# Patient Record
Sex: FEMALE | Race: OTHER | NOT HISPANIC OR LATINO | URBAN - METROPOLITAN AREA
[De-identification: names, ages, dates, MRNs, and addresses within clinical notes are randomized per-mention and may not be internally consistent; named-entity substitution may affect disease eponyms.]

---

## 2023-03-20 LAB
ABO GROUP (TYPE) IN BLOOD: NORMAL
ANTIBODY SCREEN: NORMAL
ERYTHROCYTE DISTRIBUTION WIDTH (RATIO) BY AUTOMATED COUNT: 13.4 % (ref 11.5–14.5)
ERYTHROCYTE MEAN CORPUSCULAR HEMOGLOBIN CONCENTRATION (G/DL) BY AUTOMATED: 31.3 G/DL (ref 32–36)
ERYTHROCYTE MEAN CORPUSCULAR VOLUME (FL) BY AUTOMATED COUNT: 87 FL (ref 80–100)
ERYTHROCYTES (10*6/UL) IN BLOOD BY AUTOMATED COUNT: 4.85 X10E12/L (ref 4–5.2)
HEMATOCRIT (%) IN BLOOD BY AUTOMATED COUNT: 42.2 % (ref 36–46)
HEMOGLOBIN (G/DL) IN BLOOD: 13.2 G/DL (ref 12–16)
HEPATITIS B VIRUS SURFACE AG PRESENCE IN SERUM: NONREACTIVE
HEPATITIS C VIRUS AB PRESENCE IN SERUM: NONREACTIVE
HIV 1/ 2 AG/AB SCREEN: NONREACTIVE
LEUKOCYTES (10*3/UL) IN BLOOD BY AUTOMATED COUNT: 8 X10E9/L (ref 4.4–11.3)
NRBC (PER 100 WBCS) BY AUTOMATED COUNT: 0 /100 WBC (ref 0–0)
PLATELETS (10*3/UL) IN BLOOD AUTOMATED COUNT: 234 X10E9/L (ref 150–450)
REFLEX ADDED, ANEMIA PANEL: ABNORMAL
RH FACTOR: NORMAL
RUBELLA VIRUS IGG AB: POSITIVE
SYPHILIS TOTAL AB: NONREACTIVE
THYROTROPIN (MIU/L) IN SER/PLAS BY DETECTION LIMIT <= 0.05 MIU/L: 5.18 MIU/L (ref 0.44–3.98)
THYROXINE (T4) FREE (NG/DL) IN SER/PLAS: 0.99 NG/DL (ref 0.78–1.48)
VARICELLA ZOSTER IGG: POSITIVE

## 2023-03-21 LAB
CHLAMYDIA TRACH., AMPLIFIED: NEGATIVE
N. GONORRHEA, AMPLIFIED: NEGATIVE
URINE CULTURE: NORMAL

## 2023-03-23 LAB
HEMOGLOBIN A2: 2.7 %
HEMOGLOBIN A: 97.1 %
HEMOGLOBIN F: 0.2 %
HEMOGLOBIN IDENTIFICATION INTERPRETATION: NORMAL
PATH REVIEW-HGB IDENTIFICATION: NORMAL

## 2023-05-06 ENCOUNTER — APPOINTMENT (OUTPATIENT)
Dept: LAB | Facility: LAB | Age: 33
End: 2023-05-06
Payer: COMMERCIAL

## 2023-05-11 LAB — LAB MOLECULAR CA TECHNICAL NOTES: NORMAL

## 2023-05-18 LAB
CLUE CELLS: NORMAL
NUGENT SCORE: 1
YEAST: NORMAL

## 2023-06-15 DIAGNOSIS — E55.9 VITAMIN D DEFICIENCY: Primary | ICD-10-CM

## 2023-06-15 RX ORDER — ERGOCALCIFEROL 1.25 MG/1
CAPSULE ORAL
Qty: 4 CAPSULE | Refills: 3 | Status: SHIPPED | OUTPATIENT
Start: 2023-06-15 | End: 2023-10-19

## 2023-06-21 ENCOUNTER — APPOINTMENT (OUTPATIENT)
Dept: LAB | Facility: LAB | Age: 33
End: 2023-06-21
Payer: COMMERCIAL

## 2023-06-21 LAB
THYROTROPIN (MIU/L) IN SER/PLAS BY DETECTION LIMIT <= 0.05 MIU/L: 4.11 MIU/L (ref 0.44–3.98)
THYROXINE (T4) FREE (NG/DL) IN SER/PLAS: 0.9 NG/DL (ref 0.78–1.48)

## 2023-08-15 ENCOUNTER — APPOINTMENT (OUTPATIENT)
Dept: LAB | Facility: LAB | Age: 33
End: 2023-08-15
Payer: COMMERCIAL

## 2023-08-15 LAB
CALCIUM (MG/DL) IN SER/PLAS: 8.7 MG/DL (ref 8.6–10.6)
ERYTHROCYTE DISTRIBUTION WIDTH (RATIO) BY AUTOMATED COUNT: 14 % (ref 11.5–14.5)
ERYTHROCYTE MEAN CORPUSCULAR HEMOGLOBIN CONCENTRATION (G/DL) BY AUTOMATED: 31.2 G/DL (ref 32–36)
ERYTHROCYTE MEAN CORPUSCULAR VOLUME (FL) BY AUTOMATED COUNT: 91 FL (ref 80–100)
ERYTHROCYTES (10*6/UL) IN BLOOD BY AUTOMATED COUNT: 4.01 X10E12/L (ref 4–5.2)
GLUCOSE, 1 HR SCREEN, PREG: 99 MG/DL
HEMATOCRIT (%) IN BLOOD BY AUTOMATED COUNT: 36.5 % (ref 36–46)
HEMOGLOBIN (G/DL) IN BLOOD: 11.4 G/DL (ref 12–16)
LEUKOCYTES (10*3/UL) IN BLOOD BY AUTOMATED COUNT: 12.2 X10E9/L (ref 4.4–11.3)
NRBC (PER 100 WBCS) BY AUTOMATED COUNT: 0 /100 WBC (ref 0–0)
PLATELETS (10*3/UL) IN BLOOD AUTOMATED COUNT: 216 X10E9/L (ref 150–450)
REFLEX ADDED, ANEMIA PANEL: ABNORMAL
SYPHILIS TOTAL AB: NONREACTIVE
THYROTROPIN (MIU/L) IN SER/PLAS BY DETECTION LIMIT <= 0.05 MIU/L: 2.66 MIU/L (ref 0.44–3.98)

## 2023-09-26 ENCOUNTER — LAB (OUTPATIENT)
Dept: LAB | Facility: LAB | Age: 33
End: 2023-09-26
Payer: COMMERCIAL

## 2023-09-26 LAB
CHOLESTEROL (MG/DL) IN SER/PLAS: 314 MG/DL (ref 0–199)
CHOLESTEROL IN HDL (MG/DL) IN SER/PLAS: 62.9 MG/DL
CHOLESTEROL/HDL RATIO: 5
NON-HDL CHOLESTEROL: 251 MG/DL
THYROTROPIN (MIU/L) IN SER/PLAS BY DETECTION LIMIT <= 0.05 MIU/L: 2.03 MIU/L (ref 0.44–3.98)
THYROXINE (T4) FREE (NG/DL) IN SER/PLAS: 0.69 NG/DL (ref 0.78–1.48)

## 2023-10-03 ENCOUNTER — TELEPHONE (OUTPATIENT)
Dept: OBSTETRICS AND GYNECOLOGY | Facility: HOSPITAL | Age: 33
End: 2023-10-03
Payer: COMMERCIAL

## 2023-10-03 NOTE — TELEPHONE ENCOUNTER
Rama Cottrell - 09/29/2023 11:01 AM  TASK EDITED  no option to leave vm  Rama Cottrell - 09/28/2023 11:12 AM  TASK EDITED  Rama Cottrell - 09/28/2023 11:07 AM  TASK EDITED  Cyrusut,Rosemarie - 09/28/2023 12:09 AM  TASK REPLIED TO: Previously Assigned To Rosemarie Sam  no medications recommended during pregnancy and should not affect pregnancy or delivery at all.  postpartum, I would recommend she see her PCP and they may recommend statin therapy, but they may also repeat it in the postpartum state to see if it's still high.  definitely nothing to worry about in the short term.    thanks!  ZaydaelyssaflohudsonKarsonRama - 09/27/2023 10:39 AM  TASK REASSIGNED: Previously Assigned To Rama Cottrell Dr., this patient sent a [facility] message. didn't know if there was anything she needed  Rama Cottrell - 09/27/2023 08:21 AM  TASK REASSIGNED: Previously Assigned To Rosemarie Sam  System - 09/27/2023 07:36 AM  Message from patient using [employer].      For lipid levels    Called patient no option to leave vm  FlacoRosemarie - 09/27/2023 09:32 PM  TASK CREATED  hi there    since the t4 can fluctuate so much I recommend we repeat this before making any changes to her care.  can you please let her know for me? we will order repeat at her next visit      Called patient to make sure she received my message regarding her lab work  Patient states she did see this and verbalized understanding on the plan of care  While on the phone patient stated her baby is breech and was wondering if she needed to be seen sooner than 10/10  Explained to patient that keeping appointment 10/10 is fine and Dr. Sam will discuss next steps with her  Patient verbalized understanding  Encouraged patient to call office with any other questions or concerns  NAE Cottrell RN

## 2023-10-07 ASSESSMENT — EDINBURGH POSTNATAL DEPRESSION SCALE (EPDS)
I HAVE BEEN SO UNHAPPY THAT I HAVE BEEN CRYING: NO, NEVER
THE THOUGHT OF HARMING MYSELF HAS OCCURRED TO ME: NEVER
I HAVE FELT SAD OR MISERABLE: NO, NOT AT ALL
THINGS HAVE BEEN GETTING ON TOP OF ME: NO, MOST OF THE TIME I HAVE COPED QUITE WELL
I HAVE FELT SCARED OR PANICKY FOR NO GOOD REASON: NO, NOT AT ALL
I HAVE BEEN SO UNHAPPY THAT I HAVE HAD DIFFICULTY SLEEPING: NOT AT ALL
TOTAL SCORE: 3
I HAVE BEEN ANXIOUS OR WORRIED FOR NO GOOD REASON: HARDLY EVER
I HAVE LOOKED FORWARD WITH ENJOYMENT TO THINGS: AS MUCH AS I EVER DID
I HAVE BEEN ABLE TO LAUGH AND SEE THE FUNNY SIDE OF THINGS: AS MUCH AS I ALWAYS COULD
I HAVE BLAMED MYSELF UNNECESSARILY WHEN THINGS WENT WRONG: NOT VERY OFTEN

## 2023-10-10 ENCOUNTER — LAB (OUTPATIENT)
Dept: LAB | Facility: LAB | Age: 33
End: 2023-10-10
Payer: COMMERCIAL

## 2023-10-10 ENCOUNTER — ROUTINE PRENATAL (OUTPATIENT)
Dept: OBSTETRICS AND GYNECOLOGY | Facility: HOSPITAL | Age: 33
End: 2023-10-10
Payer: COMMERCIAL

## 2023-10-10 VITALS — DIASTOLIC BLOOD PRESSURE: 72 MMHG | WEIGHT: 160 LBS | SYSTOLIC BLOOD PRESSURE: 108 MMHG | BODY MASS INDEX: 28.34 KG/M2

## 2023-10-10 DIAGNOSIS — N93.9 ABNORMAL UTERINE BLEEDING (AUB): ICD-10-CM

## 2023-10-10 DIAGNOSIS — Z3A.35 35 WEEKS GESTATION OF PREGNANCY (HHS-HCC): ICD-10-CM

## 2023-10-10 DIAGNOSIS — Z34.03: Primary | ICD-10-CM

## 2023-10-10 LAB — TSH SERPL-ACNC: 2.32 MIU/L (ref 0.44–3.98)

## 2023-10-10 PROCEDURE — 36415 COLL VENOUS BLD VENIPUNCTURE: CPT

## 2023-10-10 PROCEDURE — 99213 OFFICE O/P EST LOW 20 MIN: CPT | Performed by: STUDENT IN AN ORGANIZED HEALTH CARE EDUCATION/TRAINING PROGRAM

## 2023-10-10 PROCEDURE — 0501F PRENATAL FLOW SHEET: CPT | Performed by: STUDENT IN AN ORGANIZED HEALTH CARE EDUCATION/TRAINING PROGRAM

## 2023-10-10 PROCEDURE — 84443 ASSAY THYROID STIM HORMONE: CPT

## 2023-10-10 ASSESSMENT — ENCOUNTER SYMPTOMS
PSYCHIATRIC NEGATIVE: 0
GASTROINTESTINAL NEGATIVE: 0
CARDIOVASCULAR NEGATIVE: 0
MUSCULOSKELETAL NEGATIVE: 0
EYES NEGATIVE: 0
ENDOCRINE NEGATIVE: 0
RESPIRATORY NEGATIVE: 0
HEMATOLOGIC/LYMPHATIC NEGATIVE: 0
ALLERGIC/IMMUNOLOGIC NEGATIVE: 0
CONSTITUTIONAL NEGATIVE: 0
NEUROLOGICAL NEGATIVE: 0

## 2023-10-10 NOTE — PATIENT INSTRUCTIONS
Thanks for coming to your visit today.  The three pediatrics groups we discussed are [facility], located at [facility] as well as a [place] office, and [facility] and [facility], both of which are located on [address] between [facility] and [facility].    I will investigate whether the RSV vaccine is available at any nearby pharmacies and send you a message on [employer] if I find that it is available.

## 2023-10-11 NOTE — PROGRESS NOTES
"Subjective   Patient ID [id number]   Tamia Eisenberg is a 32 y.o.  at 35w6d with a working estimated date of delivery of 2023, by Ultrasound who presents for a routine prenatal visit. She denies vaginal bleeding, leakage of fluid, decreased fetal movements, or contractions.    Her pregnancy is complicated by:  - subclinical hypothyroidism with mild TSH elevation in first trimester, normal T4, now resolved with multiple normal TSH values in third trimester  - AMA with rrCFDNA    Objective   Physical Exam:   Weight: 72.6 kg (160 lb)  Expected Total Weight Gain: 11.5 kg (25 lb)-16 kg (35 lb)   Pregravid BMI: 22.15  BP: 108/72  Fetal Heart Rate: 140 Fundal Height (cm): 35 cm Presentation: Vertex           Prenatal Labs  Urine Dip:  No results found for: \"KETONESU\", \"GLUCOSEUR\", \"LEUKOCYTESUR\"  Lab Results   Component Value Date    HGB 11.4 (L) 08/15/2023    HCT 36.5 08/15/2023    HEPBSAG NONREACTIVE 2023       Assessment/Plan   Continue prenatal vitamin.  TSH repeated today  GBS next week  Follow up in 1 week for a routine prenatal visit.    Rosemarie Sam MD   "

## 2023-10-17 ENCOUNTER — ROUTINE PRENATAL (OUTPATIENT)
Dept: OBSTETRICS AND GYNECOLOGY | Facility: HOSPITAL | Age: 33
End: 2023-10-17
Payer: COMMERCIAL

## 2023-10-17 VITALS — SYSTOLIC BLOOD PRESSURE: 118 MMHG | WEIGHT: 161 LBS | DIASTOLIC BLOOD PRESSURE: 71 MMHG | BODY MASS INDEX: 28.52 KG/M2

## 2023-10-17 DIAGNOSIS — Z3A.36 36 WEEKS GESTATION OF PREGNANCY (HHS-HCC): ICD-10-CM

## 2023-10-17 DIAGNOSIS — Z34.03: ICD-10-CM

## 2023-10-17 DIAGNOSIS — E03.9 HYPOTHYROIDISM IN PREGNANCY, ANTEPARTUM, THIRD TRIMESTER (HHS-HCC): Primary | ICD-10-CM

## 2023-10-17 DIAGNOSIS — O99.283 HYPOTHYROIDISM IN PREGNANCY, ANTEPARTUM, THIRD TRIMESTER (HHS-HCC): Primary | ICD-10-CM

## 2023-10-17 DIAGNOSIS — O09.513 AMA (ADVANCED MATERNAL AGE) PRIMIGRAVIDA 35+, THIRD TRIMESTER (HHS-HCC): ICD-10-CM

## 2023-10-17 PROBLEM — E55.9 VITAMIN D DEFICIENCY: Status: ACTIVE | Noted: 2023-10-17

## 2023-10-17 PROBLEM — O09.523 MULTIGRAVIDA OF ADVANCED MATERNAL AGE IN THIRD TRIMESTER (HHS-HCC): Status: ACTIVE | Noted: 2023-10-17

## 2023-10-17 PROCEDURE — 0501F PRENATAL FLOW SHEET: CPT | Performed by: OBSTETRICS & GYNECOLOGY

## 2023-10-17 PROCEDURE — 87081 CULTURE SCREEN ONLY: CPT | Mod: CMCLAB | Performed by: OBSTETRICS & GYNECOLOGY

## 2023-10-17 PROCEDURE — 99213 OFFICE O/P EST LOW 20 MIN: CPT | Performed by: OBSTETRICS & GYNECOLOGY

## 2023-10-17 NOTE — PROGRESS NOTES
Patient presents for 36-5 week visit.   Answered questions about labor, epidural.  On exam today- oblique lie with head in RLQ.   Discussed RSV, COVID vaccines.     Problem List Items Addressed This Visit       Hypothyroidism in pregnancy, antepartum, third trimester - Primary    Overview     3/20 TSH 5.15 FT4 0.99  6/21 TSH 5.18 FT4 0.9  10/10 TSH 2.32         AMA (advanced maternal age) primigravida 35+, third trimester    Overview     rrNIPT          Other Visit Diagnoses       Prenatal care, first pregnancy, third trimester        Relevant Orders    Group B Streptococcus (GBS) Prenatal Screen, Culture    36 weeks gestation of pregnancy

## 2023-10-18 ENCOUNTER — HOSPITAL ENCOUNTER (OUTPATIENT)
Facility: HOSPITAL | Age: 33
Discharge: HOME | End: 2023-10-18
Attending: STUDENT IN AN ORGANIZED HEALTH CARE EDUCATION/TRAINING PROGRAM | Admitting: STUDENT IN AN ORGANIZED HEALTH CARE EDUCATION/TRAINING PROGRAM
Payer: COMMERCIAL

## 2023-10-18 ENCOUNTER — HOSPITAL ENCOUNTER (OUTPATIENT)
Facility: HOSPITAL | Age: 33
End: 2023-10-18
Attending: STUDENT IN AN ORGANIZED HEALTH CARE EDUCATION/TRAINING PROGRAM | Admitting: STUDENT IN AN ORGANIZED HEALTH CARE EDUCATION/TRAINING PROGRAM
Payer: COMMERCIAL

## 2023-10-18 VITALS
RESPIRATION RATE: 16 BRPM | HEIGHT: 63 IN | SYSTOLIC BLOOD PRESSURE: 110 MMHG | OXYGEN SATURATION: 98 % | BODY MASS INDEX: 28.63 KG/M2 | WEIGHT: 161.6 LBS | HEART RATE: 78 BPM | DIASTOLIC BLOOD PRESSURE: 69 MMHG | TEMPERATURE: 97.9 F

## 2023-10-18 DIAGNOSIS — R10.9 ABDOMINAL PAIN DURING PREGNANCY IN THIRD TRIMESTER (HHS-HCC): Primary | ICD-10-CM

## 2023-10-18 DIAGNOSIS — O26.893 ABDOMINAL PAIN DURING PREGNANCY IN THIRD TRIMESTER (HHS-HCC): Primary | ICD-10-CM

## 2023-10-18 PROBLEM — O99.113 BENIGN GESTATIONAL THROMBOCYTOPENIA IN THIRD TRIMESTER (MULTI): Status: ACTIVE | Noted: 2023-10-18

## 2023-10-18 PROBLEM — D69.6 BENIGN GESTATIONAL THROMBOCYTOPENIA IN THIRD TRIMESTER (MULTI): Status: ACTIVE | Noted: 2023-10-18

## 2023-10-18 PROBLEM — Z3A.36 36 WEEKS GESTATION OF PREGNANCY (HHS-HCC): Status: ACTIVE | Noted: 2023-10-18

## 2023-10-18 LAB
ALBUMIN SERPL BCP-MCNC: 3.1 G/DL (ref 3.4–5)
ALP SERPL-CCNC: 195 U/L (ref 33–110)
ALT SERPL W P-5'-P-CCNC: 7 U/L (ref 7–45)
AMYLASE SERPL-CCNC: 75 U/L (ref 29–103)
ANION GAP SERPL CALC-SCNC: 13 MMOL/L (ref 10–20)
AST SERPL W P-5'-P-CCNC: 16 U/L (ref 9–39)
BASOPHILS # BLD AUTO: 0.03 X10*3/UL (ref 0–0.1)
BASOPHILS NFR BLD AUTO: 0.4 %
BILIRUB SERPL-MCNC: 0.3 MG/DL (ref 0–1.2)
BUN SERPL-MCNC: 6 MG/DL (ref 6–23)
CALCIUM SERPL-MCNC: 9 MG/DL (ref 8.6–10.6)
CHLORIDE SERPL-SCNC: 107 MMOL/L (ref 98–107)
CO2 SERPL-SCNC: 20 MMOL/L (ref 21–32)
CREAT SERPL-MCNC: 0.44 MG/DL (ref 0.5–1.05)
EOSINOPHIL # BLD AUTO: 0.08 X10*3/UL (ref 0–0.7)
EOSINOPHIL NFR BLD AUTO: 1 %
ERYTHROCYTE [DISTWIDTH] IN BLOOD BY AUTOMATED COUNT: 14.4 % (ref 11.5–14.5)
GFR SERPL CREATININE-BSD FRML MDRD: >90 ML/MIN/1.73M*2
GLUCOSE SERPL-MCNC: 83 MG/DL (ref 74–99)
HCT VFR BLD AUTO: 37.2 % (ref 36–46)
HGB BLD-MCNC: 11.9 G/DL (ref 12–16)
IMM GRANULOCYTES # BLD AUTO: 0.22 X10*3/UL (ref 0–0.7)
IMM GRANULOCYTES NFR BLD AUTO: 2.7 % (ref 0–0.9)
LIPASE SERPL-CCNC: 17 U/L (ref 9–82)
LYMPHOCYTES # BLD AUTO: 1.26 X10*3/UL (ref 1.2–4.8)
LYMPHOCYTES NFR BLD AUTO: 15.3 %
MCH RBC QN AUTO: 27.7 PG (ref 26–34)
MCHC RBC AUTO-ENTMCNC: 32 G/DL (ref 32–36)
MCV RBC AUTO: 87 FL (ref 80–100)
MONOCYTES # BLD AUTO: 0.68 X10*3/UL (ref 0.1–1)
MONOCYTES NFR BLD AUTO: 8.3 %
NEUTROPHILS # BLD AUTO: 5.96 X10*3/UL (ref 1.2–7.7)
NEUTROPHILS NFR BLD AUTO: 72.3 %
NRBC BLD-RTO: 0 /100 WBCS (ref 0–0)
PLATELET # BLD AUTO: 148 X10*3/UL (ref 150–450)
PMV BLD AUTO: 12.3 FL (ref 7.5–11.5)
POTASSIUM SERPL-SCNC: 4 MMOL/L (ref 3.5–5.3)
PROT SERPL-MCNC: 5.5 G/DL (ref 6.4–8.2)
RBC # BLD AUTO: 4.29 X10*6/UL (ref 4–5.2)
SODIUM SERPL-SCNC: 136 MMOL/L (ref 136–145)
WBC # BLD AUTO: 8.2 X10*3/UL (ref 4.4–11.3)

## 2023-10-18 PROCEDURE — 83690 ASSAY OF LIPASE: CPT | Mod: CMCLAB

## 2023-10-18 PROCEDURE — 36415 COLL VENOUS BLD VENIPUNCTURE: CPT

## 2023-10-18 PROCEDURE — 36415 COLL VENOUS BLD VENIPUNCTURE: CPT | Mod: CMCLAB

## 2023-10-18 PROCEDURE — 59025 FETAL NON-STRESS TEST: CPT

## 2023-10-18 PROCEDURE — 80053 COMPREHEN METABOLIC PANEL: CPT

## 2023-10-18 PROCEDURE — 99215 OFFICE O/P EST HI 40 MIN: CPT | Mod: 25

## 2023-10-18 PROCEDURE — 82150 ASSAY OF AMYLASE: CPT | Mod: CMCLAB

## 2023-10-18 PROCEDURE — 2500000001 HC RX 250 WO HCPCS SELF ADMINISTERED DRUGS (ALT 637 FOR MEDICARE OP)

## 2023-10-18 PROCEDURE — 85025 COMPLETE CBC W/AUTO DIFF WBC: CPT | Mod: CMCLAB

## 2023-10-18 PROCEDURE — 99213 OFFICE O/P EST LOW 20 MIN: CPT

## 2023-10-18 RX ORDER — ACETAMINOPHEN 325 MG/1
975 TABLET ORAL ONCE
Status: COMPLETED | OUTPATIENT
Start: 2023-10-18 | End: 2023-10-18

## 2023-10-18 RX ORDER — CYCLOBENZAPRINE HCL 10 MG
10 TABLET ORAL 3 TIMES DAILY PRN
Qty: 9 TABLET | Refills: 0 | Status: ON HOLD | OUTPATIENT
Start: 2023-10-18 | End: 2023-10-24

## 2023-10-18 RX ORDER — ALUMINUM HYDROXIDE, MAGNESIUM HYDROXIDE, AND SIMETHICONE 1200; 120; 1200 MG/30ML; MG/30ML; MG/30ML
20 SUSPENSION ORAL ONCE
Status: COMPLETED | OUTPATIENT
Start: 2023-10-18 | End: 2023-10-18

## 2023-10-18 RX ORDER — CYCLOBENZAPRINE HCL 10 MG
10 TABLET ORAL ONCE
Status: COMPLETED | OUTPATIENT
Start: 2023-10-18 | End: 2023-10-18

## 2023-10-18 RX ADMIN — ACETAMINOPHEN 975 MG: 325 TABLET ORAL at 13:44

## 2023-10-18 RX ADMIN — ALUMINUM HYDROXIDE, MAGNESIUM HYDROXIDE, AND SIMETHICONE 20 ML: 200; 200; 20 SUSPENSION ORAL at 13:44

## 2023-10-18 RX ADMIN — CYCLOBENZAPRINE 10 MG: 10 TABLET, FILM COATED ORAL at 14:19

## 2023-10-18 SDOH — SOCIAL STABILITY: SOCIAL INSECURITY: VERBAL ABUSE: DENIES

## 2023-10-18 SDOH — HEALTH STABILITY: MENTAL HEALTH: WERE YOU ABLE TO COMPLETE ALL THE BEHAVIORAL HEALTH SCREENINGS?: YES

## 2023-10-18 SDOH — SOCIAL STABILITY: SOCIAL INSECURITY: DOES ANYONE TRY TO KEEP YOU FROM HAVING/CONTACTING OTHER FRIENDS OR DOING THINGS OUTSIDE YOUR HOME?: NO

## 2023-10-18 SDOH — SOCIAL STABILITY: SOCIAL INSECURITY: DO YOU FEEL ANYONE HAS EXPLOITED OR TAKEN ADVANTAGE OF YOU FINANCIALLY OR OF YOUR PERSONAL PROPERTY?: NO

## 2023-10-18 SDOH — HEALTH STABILITY: MENTAL HEALTH: WISH TO BE DEAD (PAST 1 MONTH): NO

## 2023-10-18 SDOH — HEALTH STABILITY: MENTAL HEALTH: NON-SPECIFIC ACTIVE SUICIDAL THOUGHTS (PAST 1 MONTH): NO

## 2023-10-18 SDOH — HEALTH STABILITY: MENTAL HEALTH: SUICIDAL BEHAVIOR (LIFETIME): NO

## 2023-10-18 SDOH — SOCIAL STABILITY: SOCIAL INSECURITY: HAS ANYONE EVER THREATENED TO HURT YOUR FAMILY OR YOUR PETS?: NO

## 2023-10-18 SDOH — SOCIAL STABILITY: SOCIAL INSECURITY: ARE YOU OR HAVE YOU BEEN THREATENED OR ABUSED PHYSICALLY, EMOTIONALLY, OR SEXUALLY BY ANYONE?: NO

## 2023-10-18 SDOH — HEALTH STABILITY: MENTAL HEALTH: HAVE YOU USED ANY SUBSTANCES (CANABIS, COCAINE, HEROIN, HALLUCINOGENS, INHALANTS, ETC.) IN THE PAST 12 MONTHS?: NO

## 2023-10-18 SDOH — SOCIAL STABILITY: SOCIAL INSECURITY: ABUSE SCREEN: ADULT

## 2023-10-18 SDOH — HEALTH STABILITY: MENTAL HEALTH: HAVE YOU USED ANY PRESCRIPTION DRUGS OTHER THAN PRESCRIBED IN THE PAST 12 MONTHS?: NO

## 2023-10-18 SDOH — SOCIAL STABILITY: SOCIAL INSECURITY: HAVE YOU HAD THOUGHTS OF HARMING ANYONE ELSE?: NO

## 2023-10-18 SDOH — SOCIAL STABILITY: SOCIAL INSECURITY: PHYSICAL ABUSE: DENIES

## 2023-10-18 SDOH — ECONOMIC STABILITY: HOUSING INSECURITY: DO YOU FEEL UNSAFE GOING BACK TO THE PLACE WHERE YOU ARE LIVING?: NO

## 2023-10-18 SDOH — SOCIAL STABILITY: SOCIAL INSECURITY: ARE THERE ANY APPARENT SIGNS OF INJURIES/BEHAVIORS THAT COULD BE RELATED TO ABUSE/NEGLECT?: NO

## 2023-10-18 ASSESSMENT — PATIENT HEALTH QUESTIONNAIRE - PHQ9
2. FEELING DOWN, DEPRESSED OR HOPELESS: NOT AT ALL
SUM OF ALL RESPONSES TO PHQ9 QUESTIONS 1 & 2: 0
1. LITTLE INTEREST OR PLEASURE IN DOING THINGS: NOT AT ALL

## 2023-10-18 ASSESSMENT — LIFESTYLE VARIABLES
AUDIT-C TOTAL SCORE: 0
SKIP TO QUESTIONS 9-10: 1
HOW OFTEN DO YOU HAVE A DRINK CONTAINING ALCOHOL: NEVER
AUDIT-C TOTAL SCORE: 0
HOW MANY STANDARD DRINKS CONTAINING ALCOHOL DO YOU HAVE ON A TYPICAL DAY: PATIENT DOES NOT DRINK
HOW OFTEN DO YOU HAVE 6 OR MORE DRINKS ON ONE OCCASION: NEVER

## 2023-10-18 ASSESSMENT — PAIN SCALES - GENERAL: PAINLEVEL: 8

## 2023-10-18 NOTE — H&P
TRIAGE History and Physical     Tamia Eisenberg is a 32 y.o. . 36w6d TRIAGE    Chief Complaint: Contractions     Assessment/Plan    Abdominal Pain  - Right sided abdominal pain, 8/10  - VSS, Afebrile, normal appetite, no n/v, normal bowel movements   - RUQ and RLQ tender to palpation on exam  - Labs: cbc, cmp, amylase, lipase all wnl (other than plts 148. new dx of gestational thrombocytopenia- discussed w/ patient)   - Trial of tylenol, flexeril, Gas-X with some improvement in right sided pain  - While in triage started to have pelvic pain and contractions noted on toco  - Cervix: c/l/h, unchanged on 2 hr recheck   - Discussed low suspicion for acute abdominal pathology, likely pain d/t contractions  - Encouraged increased hydration, tylenol prn for pain, hot packs, warm showers for discomfort      IUP at 36.6  - NST reactive  - Good fetal movement  - Patient found to be breech on BSUS, messaged [id number] RN to schedule ECV   - GBS pending   - Continue routine prenatal care  - Precautions to return discussed    Maternal Well-being  - Vital signs stable and WNL  - Emotional support and reassurance provided  - All questions and concerns addressed     D/w MD Mary Hall PA-C       Active Problems:  There are no active Hospital Problems.      Pregnancy Problems (from 23 to present)       Problem Noted Resolved    Benign gestational thrombocytopenia in third trimester (CMS/[facility]) 10/18/2023 by Mary Fam PA-C No    Priority:  Medium      Overview Signed 10/18/2023  4:49 PM by Mary Fam PA-C     Plts 148 on 10/18         36 weeks gestation of pregnancy 10/18/2023 by Mary Fam PA-C No    Priority:  Medium      Hypothyroidism in pregnancy, antepartum, third trimester 10/17/2023 by Tawana Beard MD No    Priority:  Medium      Overview Signed 10/17/2023  8:21 AM by Tawana Beard MD     3/20 TSH 5.15 FT4 0.99   TSH 5.18 FT4 0.9  10/10 TSH  2.32         AMA (advanced maternal age) primigravida 35+, third trimester 10/17/2023 by Tawana Beard MD No    Priority:  Medium      Overview Signed 10/17/2023 10:17 AM by Tawana Beard MD     rrNIPT                   Subjective   Tamia is here complaining of right sided abdominal pain. Good fetal movement. Denies vaginal bleeding., Denies contractions., Denies leaking of fluid.  Patient took shower this morning and was putting lotion on when she started to have intense right sided abdominal pain. She reports she tolerated breakfast fine. She is having normal bowel movements. She denies n/v, fevers, chills. She say the pain is constant and feels sharp. She did not self treat pain.      Obstetrical History   OB History    Para Term  AB Living   1 0 0 0 0 0   SAB IAB Ectopic Multiple Live Births   0 0 0 0 0      # Outcome Date GA Lbr Reymundo/2nd Weight Sex Delivery Anes PTL Lv   1 Current                Past Medical History  Past Medical History:   Diagnosis Date    Depression     Hypothyroidism         Past Surgical History   No past surgical history on file.    Social History  Social History     Tobacco Use    Smoking status: Never    Smokeless tobacco: Never   Substance Use Topics    Alcohol use: Not Currently     Substance and Sexual Activity   Drug Use Never       Allergies  Patient has no known allergies.     Medications  No medications prior to admission.       Objective    Last Vitals  Temp Pulse Resp BP MAP O2 Sat   36.6 °C (97.9 °F) 78 16 110/69   98 %     Physical Examination  GENERAL: Examination reveals a well developed, well nourished, gravid female in no acute distress. She is alert and cooperative.  ABDOMEN: tender to palpation in RUQ and RLQ, no guarding or rigidity, soft, gravid, non-distended, no abnormal masses  FHR is 125, mod variability, +accels, -decels   [place] reading:   [place] irregularly   The fetus is in a breech    presentation, determined by  ultrasound  CERVIX: Closed cm dilated, 0 % effaced, -3 station; MEMBRANES are  intact   EXTREMITIES: no redness or tenderness in the calves or thighs, no edema  SKIN: normal coloration and turgor, no rashes  NEUROLOGICAL: alert, oriented, normal speech, no focal findings or movement disorder noted  PSYCHOLOGICAL: awake and alert; oriented to person, place, and time    Lab Review  Labs in chart were reviewed.  Lab Results   Component Value Date    WBC 8.2 10/18/2023    HGB 11.9 (L) 10/18/2023    HCT 37.2 10/18/2023     (L) 10/18/2023     Lab Results   Component Value Date    GLUCOSE 83 10/18/2023     10/18/2023    K 4.0 10/18/2023     10/18/2023    CO2 20 (L) 10/18/2023    ANIONGAP 13 10/18/2023    BUN 6 10/18/2023    CREATININE 0.44 (L) 10/18/2023    EGFR >90 10/18/2023    CALCIUM 9.0 10/18/2023    ALBUMIN 3.1 (L) 10/18/2023    PROT 5.5 (L) 10/18/2023    ALKPHOS 195 (H) 10/18/2023    ALT 7 10/18/2023    AST 16 10/18/2023    BILITOT 0.3 10/18/2023

## 2023-10-19 ENCOUNTER — TELEPHONE (OUTPATIENT)
Dept: INFUSION THERAPY | Facility: HOSPITAL | Age: 33
End: 2023-10-19
Payer: COMMERCIAL

## 2023-10-19 DIAGNOSIS — E55.9 VITAMIN D DEFICIENCY: ICD-10-CM

## 2023-10-19 LAB — GP B STREP GENITAL QL CULT: NORMAL

## 2023-10-19 RX ORDER — ERGOCALCIFEROL 1.25 MG/1
1 CAPSULE ORAL
Qty: 4 CAPSULE | Refills: 1 | Status: SHIPPED | OUTPATIENT
Start: 2023-10-19 | End: 2023-11-21

## 2023-10-19 NOTE — TELEPHONE ENCOUNTER
Verified patient by name and .  Called patient to let her know her ECV is scheduled for tomorrow 10/19 @1pm.  Instructed patient to report to L&D at that time.  Educated that she can have nothing to eat for 8 hours prior to her scheduled time, clear liquid up until 11am and nothing to eat or drink after 11am up until her procedure time @1pm.  Patient verbalized understanding and all questions and concerns addressed.

## 2023-10-20 ENCOUNTER — ANESTHESIA (OUTPATIENT)
Dept: OBSTETRICS AND GYNECOLOGY | Facility: HOSPITAL | Age: 33
End: 2023-10-20
Payer: COMMERCIAL

## 2023-10-20 ENCOUNTER — ANESTHESIA EVENT (OUTPATIENT)
Dept: OBSTETRICS AND GYNECOLOGY | Facility: HOSPITAL | Age: 33
End: 2023-10-20
Payer: COMMERCIAL

## 2023-10-20 ENCOUNTER — HOSPITAL ENCOUNTER (INPATIENT)
Facility: HOSPITAL | Age: 33
LOS: 5 days | Discharge: HOME | End: 2023-10-25
Attending: STUDENT IN AN ORGANIZED HEALTH CARE EDUCATION/TRAINING PROGRAM | Admitting: STUDENT IN AN ORGANIZED HEALTH CARE EDUCATION/TRAINING PROGRAM
Payer: COMMERCIAL

## 2023-10-20 DIAGNOSIS — O26.893 ABDOMINAL PAIN DURING PREGNANCY IN THIRD TRIMESTER (HHS-HCC): ICD-10-CM

## 2023-10-20 DIAGNOSIS — R10.9 ABDOMINAL PAIN DURING PREGNANCY IN THIRD TRIMESTER (HHS-HCC): ICD-10-CM

## 2023-10-20 DIAGNOSIS — D62 POSTOPERATIVE ANEMIA DUE TO ACUTE BLOOD LOSS: ICD-10-CM

## 2023-10-20 PROBLEM — E78.5 HYPERLIPIDEMIA: Status: ACTIVE | Noted: 2023-10-20

## 2023-10-20 PROBLEM — K21.9 GASTROESOPHAGEAL REFLUX DISEASE: Status: ACTIVE | Noted: 2023-10-20

## 2023-10-20 PROBLEM — H54.7 VISION LOSS: Status: ACTIVE | Noted: 2023-10-20

## 2023-10-20 LAB
ABO GROUP (TYPE) IN BLOOD: NORMAL
ANTIBODY SCREEN: NORMAL
ERYTHROCYTE [DISTWIDTH] IN BLOOD BY AUTOMATED COUNT: 14.3 % (ref 11.5–14.5)
HCT VFR BLD AUTO: 37.9 % (ref 36–46)
HGB BLD-MCNC: 12.3 G/DL (ref 12–16)
MCH RBC QN AUTO: 28.1 PG (ref 26–34)
MCHC RBC AUTO-ENTMCNC: 32.5 G/DL (ref 32–36)
MCV RBC AUTO: 87 FL (ref 80–100)
NRBC BLD-RTO: 0 /100 WBCS (ref 0–0)
PLATELET # BLD AUTO: 162 X10*3/UL (ref 150–450)
PMV BLD AUTO: 12.7 FL (ref 7.5–11.5)
RBC # BLD AUTO: 4.38 X10*6/UL (ref 4–5.2)
RH FACTOR (ANTIGEN D): NORMAL
T PALLIDUM AB SER QL: NONREACTIVE
WBC # BLD AUTO: 11 X10*3/UL (ref 4.4–11.3)

## 2023-10-20 PROCEDURE — 3700000002 HC GENERAL ANESTHESIA TIME - EACH INCREMENTAL 1 MINUTE: Performed by: STUDENT IN AN ORGANIZED HEALTH CARE EDUCATION/TRAINING PROGRAM

## 2023-10-20 PROCEDURE — 1120000001 HC OB PRIVATE ROOM DAILY

## 2023-10-20 PROCEDURE — 51702 INSERT TEMP BLADDER CATH: CPT

## 2023-10-20 PROCEDURE — 10S0XZZ REPOSITION PRODUCTS OF CONCEPTION, EXTERNAL APPROACH: ICD-10-PCS | Performed by: OBSTETRICS & GYNECOLOGY

## 2023-10-20 PROCEDURE — 36415 COLL VENOUS BLD VENIPUNCTURE: CPT | Mod: CMCLAB | Performed by: STUDENT IN AN ORGANIZED HEALTH CARE EDUCATION/TRAINING PROGRAM

## 2023-10-20 PROCEDURE — 86901 BLOOD TYPING SEROLOGIC RH(D): CPT | Performed by: STUDENT IN AN ORGANIZED HEALTH CARE EDUCATION/TRAINING PROGRAM

## 2023-10-20 PROCEDURE — 59412 ANTEPARTUM MANIPULATION: CPT | Mod: GC | Performed by: STUDENT IN AN ORGANIZED HEALTH CARE EDUCATION/TRAINING PROGRAM

## 2023-10-20 PROCEDURE — 86920 COMPATIBILITY TEST SPIN: CPT

## 2023-10-20 PROCEDURE — 59412 ANTEPARTUM MANIPULATION: CPT | Performed by: STUDENT IN AN ORGANIZED HEALTH CARE EDUCATION/TRAINING PROGRAM

## 2023-10-20 PROCEDURE — 2500000005 HC RX 250 GENERAL PHARMACY W/O HCPCS: Performed by: STUDENT IN AN ORGANIZED HEALTH CARE EDUCATION/TRAINING PROGRAM

## 2023-10-20 PROCEDURE — 3700000001 HC GENERAL ANESTHESIA TIME - INITIAL BASE CHARGE: Performed by: STUDENT IN AN ORGANIZED HEALTH CARE EDUCATION/TRAINING PROGRAM

## 2023-10-20 PROCEDURE — 2500000004 HC RX 250 GENERAL PHARMACY W/ HCPCS (ALT 636 FOR OP/ED): Performed by: STUDENT IN AN ORGANIZED HEALTH CARE EDUCATION/TRAINING PROGRAM

## 2023-10-20 PROCEDURE — 86780 TREPONEMA PALLIDUM: CPT | Performed by: STUDENT IN AN ORGANIZED HEALTH CARE EDUCATION/TRAINING PROGRAM

## 2023-10-20 PROCEDURE — A59412 PR ANTEPARTUM HEAD MANIPULATION: Performed by: STUDENT IN AN ORGANIZED HEALTH CARE EDUCATION/TRAINING PROGRAM

## 2023-10-20 PROCEDURE — 85027 COMPLETE CBC AUTOMATED: CPT | Performed by: STUDENT IN AN ORGANIZED HEALTH CARE EDUCATION/TRAINING PROGRAM

## 2023-10-20 RX ORDER — OXYTOCIN 10 [USP'U]/ML
10 INJECTION, SOLUTION INTRAMUSCULAR; INTRAVENOUS ONCE AS NEEDED
Status: DISCONTINUED | OUTPATIENT
Start: 2023-10-20 | End: 2023-10-21

## 2023-10-20 RX ORDER — TERBUTALINE SULFATE 1 MG/ML
0.25 INJECTION SUBCUTANEOUS ONCE AS NEEDED
Status: DISCONTINUED | OUTPATIENT
Start: 2023-10-20 | End: 2023-10-21

## 2023-10-20 RX ORDER — PHENYLEPHRINE HCL IN 0.9% NACL 1 MG/10 ML
SYRINGE (ML) INTRAVENOUS AS NEEDED
Status: DISCONTINUED | OUTPATIENT
Start: 2023-10-20 | End: 2023-10-20

## 2023-10-20 RX ORDER — OXYTOCIN/0.9 % SODIUM CHLORIDE 30/500 ML
60 PLASTIC BAG, INJECTION (ML) INTRAVENOUS ONCE AS NEEDED
Status: DISCONTINUED | OUTPATIENT
Start: 2023-10-20 | End: 2023-10-21

## 2023-10-20 RX ORDER — BUPIVACAINE HYDROCHLORIDE 2.5 MG/ML
INJECTION, SOLUTION EPIDURAL; INFILTRATION; INTRACAUDAL AS NEEDED
Status: DISCONTINUED | OUTPATIENT
Start: 2023-10-20 | End: 2023-10-20

## 2023-10-20 RX ORDER — ONDANSETRON 4 MG/1
4 TABLET, FILM COATED ORAL EVERY 6 HOURS PRN
Status: DISCONTINUED | OUTPATIENT
Start: 2023-10-20 | End: 2023-10-21

## 2023-10-20 RX ORDER — METOCLOPRAMIDE HYDROCHLORIDE 5 MG/ML
10 INJECTION INTRAMUSCULAR; INTRAVENOUS EVERY 6 HOURS PRN
Status: DISCONTINUED | OUTPATIENT
Start: 2023-10-20 | End: 2023-10-21

## 2023-10-20 RX ORDER — METOCLOPRAMIDE 10 MG/1
10 TABLET ORAL EVERY 6 HOURS PRN
Status: DISCONTINUED | OUTPATIENT
Start: 2023-10-20 | End: 2023-10-21

## 2023-10-20 RX ORDER — LIDOCAINE HYDROCHLORIDE 10 MG/ML
30 INJECTION INFILTRATION; PERINEURAL ONCE AS NEEDED
Status: DISCONTINUED | OUTPATIENT
Start: 2023-10-20 | End: 2023-10-21

## 2023-10-20 RX ORDER — LABETALOL HYDROCHLORIDE 5 MG/ML
20 INJECTION, SOLUTION INTRAVENOUS ONCE AS NEEDED
Status: DISCONTINUED | OUTPATIENT
Start: 2023-10-20 | End: 2023-10-21

## 2023-10-20 RX ORDER — CARBOPROST TROMETHAMINE 250 UG/ML
250 INJECTION, SOLUTION INTRAMUSCULAR ONCE AS NEEDED
Status: DISCONTINUED | OUTPATIENT
Start: 2023-10-20 | End: 2023-10-21

## 2023-10-20 RX ORDER — METHYLERGONOVINE MALEATE 0.2 MG/ML
0.2 INJECTION INTRAVENOUS ONCE AS NEEDED
Status: DISCONTINUED | OUTPATIENT
Start: 2023-10-20 | End: 2023-10-20

## 2023-10-20 RX ORDER — SODIUM CHLORIDE, SODIUM LACTATE, POTASSIUM CHLORIDE, CALCIUM CHLORIDE 600; 310; 30; 20 MG/100ML; MG/100ML; MG/100ML; MG/100ML
INJECTION, SOLUTION INTRAVENOUS CONTINUOUS PRN
Status: DISCONTINUED | OUTPATIENT
Start: 2023-10-20 | End: 2023-10-20

## 2023-10-20 RX ORDER — NIFEDIPINE 10 MG/1
10 CAPSULE ORAL ONCE AS NEEDED
Status: DISCONTINUED | OUTPATIENT
Start: 2023-10-20 | End: 2023-10-21

## 2023-10-20 RX ORDER — MISOPROSTOL 200 UG/1
800 TABLET ORAL ONCE AS NEEDED
Status: DISCONTINUED | OUTPATIENT
Start: 2023-10-20 | End: 2023-10-20

## 2023-10-20 RX ORDER — ONDANSETRON HYDROCHLORIDE 2 MG/ML
4 INJECTION, SOLUTION INTRAVENOUS EVERY 6 HOURS PRN
Status: DISCONTINUED | OUTPATIENT
Start: 2023-10-20 | End: 2023-10-21

## 2023-10-20 RX ORDER — LIDOCAINE HYDROCHLORIDE 20 MG/ML
INJECTION, SOLUTION EPIDURAL; INFILTRATION; INTRACAUDAL; PERINEURAL AS NEEDED
Status: DISCONTINUED | OUTPATIENT
Start: 2023-10-20 | End: 2023-10-20

## 2023-10-20 RX ORDER — PHENYLEPHRINE 10 MG/250 ML(40 MCG/ML)IN 0.9 % SOD.CHLORIDE INTRAVENOUS
CONTINUOUS PRN
Status: DISCONTINUED | OUTPATIENT
Start: 2023-10-20 | End: 2023-10-20

## 2023-10-20 RX ORDER — SODIUM CHLORIDE, SODIUM LACTATE, POTASSIUM CHLORIDE, CALCIUM CHLORIDE 600; 310; 30; 20 MG/100ML; MG/100ML; MG/100ML; MG/100ML
125 INJECTION, SOLUTION INTRAVENOUS CONTINUOUS
Status: DISCONTINUED | OUTPATIENT
Start: 2023-10-20 | End: 2023-10-21

## 2023-10-20 RX ORDER — TRANEXAMIC ACID 100 MG/ML
1000 INJECTION, SOLUTION INTRAVENOUS ONCE AS NEEDED
Status: DISCONTINUED | OUTPATIENT
Start: 2023-10-20 | End: 2023-10-21

## 2023-10-20 RX ORDER — LOPERAMIDE HYDROCHLORIDE 2 MG/1
4 CAPSULE ORAL EVERY 2 HOUR PRN
Status: DISCONTINUED | OUTPATIENT
Start: 2023-10-20 | End: 2023-10-21

## 2023-10-20 RX ORDER — HYDRALAZINE HYDROCHLORIDE 20 MG/ML
5 INJECTION INTRAMUSCULAR; INTRAVENOUS ONCE AS NEEDED
Status: DISCONTINUED | OUTPATIENT
Start: 2023-10-20 | End: 2023-10-21

## 2023-10-20 RX ADMIN — SODIUM CHLORIDE, POTASSIUM CHLORIDE, SODIUM LACTATE AND CALCIUM CHLORIDE 500 ML: 600; 310; 30; 20 INJECTION, SOLUTION INTRAVENOUS at 19:41

## 2023-10-20 RX ADMIN — Medication 0.55 MCG/KG/MIN: at 17:21

## 2023-10-20 RX ADMIN — BUPIVACAINE HYDROCHLORIDE 1 ML: 2.5 INJECTION, SOLUTION EPIDURAL; INFILTRATION; INTRACAUDAL; PERINEURAL at 17:02

## 2023-10-20 RX ADMIN — LIDOCAINE HYDROCHLORIDE 5 MG: 20 INJECTION, SOLUTION EPIDURAL; INFILTRATION; INTRACAUDAL; PERINEURAL at 17:15

## 2023-10-20 RX ADMIN — Medication 80 MCG: at 17:53

## 2023-10-20 RX ADMIN — SODIUM CHLORIDE, POTASSIUM CHLORIDE, SODIUM LACTATE AND CALCIUM CHLORIDE 125 ML/HR: 600; 310; 30; 20 INJECTION, SOLUTION INTRAVENOUS at 18:14

## 2023-10-20 RX ADMIN — SODIUM CHLORIDE, POTASSIUM CHLORIDE, SODIUM LACTATE AND CALCIUM CHLORIDE: 600; 310; 30; 20 INJECTION, SOLUTION INTRAVENOUS at 16:37

## 2023-10-20 SDOH — ECONOMIC STABILITY: FOOD INSECURITY

## 2023-10-20 SDOH — ECONOMIC STABILITY: TRANSPORTATION INSECURITY
IN THE PAST 12 MONTHS, HAS THE LACK OF TRANSPORTATION KEPT YOU FROM MEDICAL APPOINTMENTS OR FROM GETTING MEDICATIONS?: NO

## 2023-10-20 SDOH — ECONOMIC STABILITY: TRANSPORTATION INSECURITY
IN THE PAST 12 MONTHS, HAS LACK OF TRANSPORTATION KEPT YOU FROM MEETINGS, WORK, OR FROM GETTING THINGS NEEDED FOR DAILY LIVING?: NO

## 2023-10-20 SDOH — ECONOMIC STABILITY: FOOD INSECURITY: WITHIN THE PAST 12 MONTHS, YOU WORRIED THAT YOUR FOOD WOULD RUN OUT BEFORE YOU GOT THE MONEY TO BUY MORE.: NEVER TRUE

## 2023-10-20 SDOH — ECONOMIC STABILITY: TRANSPORTATION INSECURITY: IN THE PAST 12 MONTHS, HAS LACK OF TRANSPORTATION KEPT YOU FROM MEDICAL APPOINTMENTS OR FROM GETTING MEDICATIONS?: NO

## 2023-10-20 SDOH — HEALTH STABILITY: MENTAL HEALTH: CURRENT SMOKER: 0

## 2023-10-20 SDOH — ECONOMIC STABILITY: FOOD INSECURITY: WITHIN THE PAST 12 MONTHS, YOU WORRIED THAT YOUR FOOD WOULD RUN OUT BEFORE YOU GOT MONEY TO BUY MORE.: NEVER TRUE

## 2023-10-20 SDOH — ECONOMIC STABILITY: FOOD INSECURITY: WITHIN THE PAST 12 MONTHS, THE FOOD YOU BOUGHT JUST DIDN’T LAST AND YOU DIDN’T HAVE MONEY TO GET MORE.: NEVER TRUE

## 2023-10-20 SDOH — ECONOMIC STABILITY: FOOD INSECURITY: WITHIN THE PAST 12 MONTHS, THE FOOD YOU BOUGHT JUST DIDN'T LAST AND YOU DIDN'T HAVE MONEY TO GET MORE.: NEVER TRUE

## 2023-10-20 SDOH — ECONOMIC STABILITY: TRANSPORTATION INSECURITY

## 2023-10-20 ASSESSMENT — PAIN SCALES - GENERAL
PAINLEVEL_OUTOF10: 0 - NO PAIN
PAIN_LEVEL: 2
PAINLEVEL_OUTOF10: 0 - NO PAIN
PAINLEVEL_OUTOF10: 0 - NO PAIN
PAINLEVEL_OUTOF10: 3
PAINLEVEL_OUTOF10: 0 - NO PAIN
PAINLEVEL_OUTOF10: 0 - NO PAIN

## 2023-10-20 ASSESSMENT — COLUMBIA-SUICIDE SEVERITY RATING SCALE - C-SSRS
2. HAVE YOU ACTUALLY HAD ANY THOUGHTS OF KILLING YOURSELF?: NO
6. HAVE YOU EVER DONE ANYTHING, STARTED TO DO ANYTHING, OR PREPARED TO DO ANYTHING TO END YOUR LIFE?: NO
1. IN THE PAST MONTH, HAVE YOU WISHED YOU WERE DEAD OR WISHED YOU COULD GO TO SLEEP AND NOT WAKE UP?: NO

## 2023-10-20 ASSESSMENT — ACTIVITIES OF DAILY LIVING (ADL): LACK_OF_TRANSPORTATION: NO

## 2023-10-20 NOTE — ANESTHESIA PREPROCEDURE EVALUATION
Patient: Tamia Eisenberg    Evaluation Method: In-person visit    Procedure Information    Date: 10/20/23  Procedure: Labor Analgesia         Relevant Problems   Anesthesia (within normal limits)      Cardiovascular  Palpitations 1 week ago lasted for 2 days, no longer having palpitations    (+) Hyperlipidemia      Endocrine   (+) Hypothyroidism in pregnancy, antepartum, third trimester      GI   (+) Gastroesophageal reflux disease      /Renal (within normal limits)      Neuro/Psych (within normal limits)      Pulmonary (within normal limits)      GI/Hepatic (within normal limits)      Hematology   (+) Benign gestational thrombocytopenia in third trimester (CMS/HCC)      Musculoskeletal (within normal limits)      Eyes, Ears, Nose, and Throat   (+) Vision loss       Clinical information reviewed:   Tobacco  Allergies  Meds   Med Hx  Surg Hx  OB Status  Fam Hx  Soc   Hx        NPO Detail:  NPO/Void Status  Carbonhydrate Drink Given Prior to Surgery? : N  Date of Last Liquid: 10/19/23  Time of Last Liquid: 2000  Date of Last Solid: 10/19/23  Time of Last Solid: 2000  Last Intake Type: Food         OB/Gyn Evaluation    Present Pregnancy    Patient is pregnant now.   Obstetric History                Physical Exam    Airway  Mallampati: I  TM distance: >3 FB  Neck ROM: full     Cardiovascular - normal exam     Dental    Pulmonary - normal exam     Abdominal            Anesthesia Plan    ASA 2     CSE     The patient is not a current smoker.    Anesthetic plan and risks discussed with patient and spouse.  Use of blood products discussed with patient and spouse who.    Plan discussed with CRNA and attending.

## 2023-10-20 NOTE — ANESTHESIA PREPROCEDURE EVALUATION
Patient: Tamia Eisenberg    Evaluation Method: In-person visit    Procedure Information    Date: 10/20/23  Procedure: Nerve Block         Relevant Problems   Cardiovascular   (+) Hyperlipidemia      Endocrine   (+) Hypothyroidism in pregnancy, antepartum, third trimester      GI   (+) Gastroesophageal reflux disease      Hematology   (+) Benign gestational thrombocytopenia in third trimester (CMS/HCC)      Eyes, Ears, Nose, and Throat   (+) Vision loss       Clinical information reviewed:   Tobacco  Allergies  Meds   Med Hx  Surg Hx  OB Status  Fam Hx  Soc   Hx        NPO Detail:  NPO/Void Status  Carbonhydrate Drink Given Prior to Surgery? : N  Date of Last Liquid: 10/20/23  Time of Last Liquid: 1130  Date of Last Solid: 10/19/23  Time of Last Solid: 2000  Last Intake Type: Food         OB/Gyn Evaluation    Present Pregnancy    Patient is pregnant now.   Obstetric History                Physical Exam    Airway  Mallampati: I  TM distance: >3 FB  Neck ROM: full     Cardiovascular   Rhythm: regular     Dental    Pulmonary   Breath sounds clear to auscultation     Abdominal      Other findings: Gravid uterus        Anesthesia Plan    ASA 2     spinal     The patient is not a current smoker.    inhalational induction   Anesthetic plan and risks discussed with patient.  Use of blood products discussed with patient who consented to blood products.    Plan discussed with attending.

## 2023-10-20 NOTE — OP NOTE
Date: 10/20/2023  OR Location: [facility]    Name: Tamia Eisenberg, : 1990, Age: 32 y.o., MRN: [id number], Sex: female    Diagnosis  * No Diagnosis Codes entered * * No Diagnosis Codes entered *   Breech presentation    Procedures    * Version External Cephalic    Surgeons   Tawana Beard      Resident/Fellow/Other Assistant:  Emilia Castillo    Procedure Summary  Anesthesia: epidural   Anesthesia Staff: Anesthesiologist: Delmar Galvez MD  Estimated Blood Loss: 0mL  Intra-op Medications: * No intraprocedure medications in log *           Anesthesia Record               Intraprocedure I/O Totals       None           Specimen: No specimens collected     Staff:   No surgical staff documented.      Procedure Details:  The patient was seen in the preoperative area. The site of surgery was properly noted/marked if necessary per policy. The patient has been actively warmed in preoperative area. Preoperative antibiotics are not indicated. Venous thrombosis prophylaxis are not indicated.    Findings: fetus in breech presentation, fetal head at midline with spine to maternal left    After administration of regional analgesia, ultrasound was performed with findings as above. Fetal heart rate baseline was 130s.  Plaza catheter and SCDs were applied in case of need to convert quickly to  delivery.  The patient was placed in trendelenburg positioning and ultrasound gel was applied to the abdomen.  The breech was destationed out of the pelvis and lateral pressure was applied to the fetal vertex.  The fetal head was felt to displace slightly to maternal left, but subsequently returned to midline. Fetal heart rate was assessed by ultrasound at this time and was 136. A period of rest was taken for 1 minute.  Pressure was again applied to the fetal breech in a counterclockwise direction while pressure was applied to the fetal vertex.  The fetal head was palpated to remain at midline.  Again FHR was evaluated by  ultrasound and was 138.  A 1 minute period of rest was taken. Clockwise pressure was them applied to rotate the fetus, however no change in position was felt. Ultrasound again confirmed breech presentation. FHR at this time was 90, and subsequently increased gradually to 130s after 3 minutes. Given deceleration in fetal heart rate and lack of change in fetal position with prior attempts, decision was made to stop the procedure. EFM was re-applied and continued for approximately 10 minutes in the OR, during which time it remained category 1, and the procedure was concluded.      The patient was returned to her room on L&D.  She will be monitored continuously for an additional 2 hours and discharged with plan for pLTCS at 39wks if FHT remains reassuring of fetal status.      Complications:  None; patient tolerated the procedure well.     Disposition: Home after monitoring  Condition: stable      I was present for the entirety of the procedure(s).     Tawana Beard MD

## 2023-10-20 NOTE — H&P
Obstetrical Admission History and Physical     Tamia Eisenberg is a 32 y.o. .    Chief Complaint: No chief complaint on file.    Assessment/Plan    33 yo G1 @ 37.1 wga by 8.4 wk us presenting for ECV in the setting of breech malpresentation and unstable lie.    Medical Problems       Problem List       * (Principal) Breech presentation on examination, fetus 1    Overview Signed 10/20/2023  1:45 PM by Massimo Chowdary MD     ECV today (10/20)         Hypothyroidism in pregnancy, antepartum, third trimester    Overview Addendum 10/20/2023 11:15 AM by Kelsie Hanson MD     subclinical hypothyroidism with mild TSH elevation in first trimester, normal T4, now resolved with multiple normal TSH values in third trimester   3/20 TSH 5.15 FT4 0.99   TSH 5.18 FT4 0.9  10/10 TSH 2.32         AMA (advanced maternal age) primigravida 35+, third trimester    Overview Addendum 10/20/2023  1:45 PM by Massimo Chowdary MD     RR cfDNA         Benign gestational thrombocytopenia in third trimester (CMS/HCC)    Overview Addendum 10/20/2023 11:16 AM by Kelsie Hanson MD     Plts 148 on 10/18 from 216 in August         36 weeks gestation of pregnancy        Subjective   Denies ctx, VB, LOF, DFM.    Obstetrical History   OB History    Para Term  AB Living   1 0 0 0 0 0   SAB IAB Ectopic Multiple Live Births   0 0 0 0 0      # Outcome Date GA Lbr Reymundo/2nd Weight Sex Delivery Anes PTL Lv   1 Current              Past Medical History  Past Medical History:   Diagnosis Date    Depression     Hypothyroidism      Past Surgical History   History reviewed. No pertinent surgical history.    Social History  Social History     Tobacco Use    Smoking status: Never    Smokeless tobacco: Never   Substance Use Topics    Alcohol use: Not Currently     Substance and Sexual Activity   Drug Use Never     Allergies  Patient has no known allergies.     Medications  Medications Prior to Admission   Medication Sig Dispense  Refill Last Dose    cyclobenzaprine (Flexeril) 10 mg tablet Take 1 tablet (10 mg) by mouth 3 times a day as needed for muscle spasms for up to 3 days. 9 tablet 0     ergocalciferol (Vitamin D-2) 1.25 MG (84607 UT) capsule Take 1 capsule (1,250 mcg) by mouth 1 (one) time per week. 4 capsule 1        Objective    Last Vitals  Temp Pulse Resp BP MAP O2 Sat     85   113/68         Physical Examination  GENERAL: Examination reveals a well developed, well nourished, gravid female in no acute distress. She is alert and cooperative.  HEENT: External ears normal. Nose normal, no erythema or discharge. Mouth and throat clear.  NECK: supple, no significant adenopathy  LUNGS: unlabored breathing  HEART: regular rate  EXTREMITIES: no limitations in range of motion  SKIN: normal coloration and turgor, no rashes  NEUROLOGICAL: alert, oriented, normal speech, no focal findings or movement disorder noted  PSYCHOLOGICAL: awake and alert; oriented to person, place, and time    Lab Review  Labs in chart were reviewed.

## 2023-10-20 NOTE — ANESTHESIA PROCEDURE NOTES
CSE Block    Start time: 10/20/2023 4:54 PM  End time: 10/20/2023 5:15 PM  Reason for block: procedure for pain}  Staffing  Performed: attending and resident   Authorized by: Delmar Galvez MD    Performed by: Beba Hollins MD    Preanesthetic Checklist  Completed: patient identified, IV checked, risks and benefits discussed, surgical consent, monitors and equipment checked, pre-op evaluation, timeout performed and sterile techniques followed  Block Timeout  RN/Licensed healthcare professional reads aloud to the Anesthesia provider and entire team: Patient identity, procedure with side and site, patient position, and as applicable the availability of implants/special equipment/special requirements.  Patient on coagulant treatment: no  Timeout performed at: 10/20/2023 4:54 PM    CSE Block  Patient position: sitting  Prep: ChloraPrep  Sterility prep: gloves, hand, mask and cap  Patient monitoring: blood pressure, continuous pulse oximetry and heart rate  Approach: midline  Local numbing: lidocaine 1% to skin and subcutaneous tissues    Guidance: landmark technique    Epidural Needle  LUIS MIGUEL technique: saline  Needle type: Tuohy   Needle gauge: 17 G  Needle length: 8.9 cm  Needle insertion depth: 7 cm  Spinal Needle  Needle type: pencil-point   Needle gauge: 25 G  Needle length: 12.7 cm  Free flow CSF: yes  Epidural Catheter  Catheter type: stylet  Catheter size: 20 G  Catheter at skin depth: 13 cm  Catheter securement method: clear occlusive dressing and liquid medical adhesive    Test dose given at 10/20/2023 5:10 PM  Test dose: Other (see comment)            Assessment  Sensory level: T6 bilateral  Block outcome: patient comfortable  Number of attempts: 1  Procedure assessment: patient tolerated procedure well with no immediate complications

## 2023-10-20 NOTE — ANESTHESIA POSTPROCEDURE EVALUATION
Patient: Tamia Eisenberg    Procedure Summary       Date: 10/20/23 Room / Location:     Anesthesia Start: 1637 Anesthesia Stop: 1808    Procedure: Nerve Block Diagnosis:     Scheduled Providers:  Responsible Provider: Delmar Galvez MD    Anesthesia Type: spinal ASA Status: 2            Anesthesia Type: spinal    Vitals Value Taken Time   /61 10/20/23 1804   Temp . 10/20/23 1808   Pulse 97 10/20/23 1807   Resp 18 10/20/23 1808   SpO2 99 % 10/20/23 1807       Anesthesia Post Evaluation    Patient location during evaluation: floor  Patient participation: complete - patient participated  Level of consciousness: awake and alert  Pain score: 2  Pain management: adequate  Airway patency: patent  Cardiovascular status: acceptable  Respiratory status: acceptable  Hydration status: acceptable        No notable events documented.

## 2023-10-21 ENCOUNTER — ANESTHESIA EVENT (OUTPATIENT)
Dept: OBSTETRICS AND GYNECOLOGY | Facility: HOSPITAL | Age: 33
End: 2023-10-21
Payer: COMMERCIAL

## 2023-10-21 ENCOUNTER — ANESTHESIA (OUTPATIENT)
Dept: OBSTETRICS AND GYNECOLOGY | Facility: HOSPITAL | Age: 33
End: 2023-10-21
Payer: COMMERCIAL

## 2023-10-21 LAB
APTT PPP: 25 SECONDS (ref 27–38)
ERYTHROCYTE [DISTWIDTH] IN BLOOD BY AUTOMATED COUNT: 14.6 % (ref 11.5–14.5)
FIBRINOGEN PPP-MCNC: 462 MG/DL (ref 200–400)
HCT VFR BLD AUTO: 34.8 % (ref 36–46)
HGB BLD-MCNC: 11.3 G/DL (ref 12–16)
INR PPP: 1 (ref 0.9–1.1)
MCH RBC QN AUTO: 28 PG (ref 26–34)
MCHC RBC AUTO-ENTMCNC: 32.5 G/DL (ref 32–36)
MCV RBC AUTO: 86 FL (ref 80–100)
NRBC BLD-RTO: 0 /100 WBCS (ref 0–0)
PLATELET # BLD AUTO: 153 X10*3/UL (ref 150–450)
PMV BLD AUTO: 11.4 FL (ref 7.5–11.5)
PROTHROMBIN TIME: 10.9 SECONDS (ref 9.8–12.8)
RBC # BLD AUTO: 4.04 X10*6/UL (ref 4–5.2)
WBC # BLD AUTO: 9.1 X10*3/UL (ref 4.4–11.3)

## 2023-10-21 PROCEDURE — 59510 CESAREAN DELIVERY: CPT | Performed by: OBSTETRICS & GYNECOLOGY

## 2023-10-21 PROCEDURE — 85027 COMPLETE CBC AUTOMATED: CPT | Mod: CMCLAB | Performed by: STUDENT IN AN ORGANIZED HEALTH CARE EDUCATION/TRAINING PROGRAM

## 2023-10-21 PROCEDURE — 3700000014 HC AN EPIDURAL BLOCK CHARGE: Performed by: OBSTETRICS & GYNECOLOGY

## 2023-10-21 PROCEDURE — 7100000016 HC LABOR RECOVERY PER HOUR: Performed by: OBSTETRICS & GYNECOLOGY

## 2023-10-21 PROCEDURE — A59514 PR CESAREAN DELIVERY ONLY: Performed by: ANESTHESIOLOGY

## 2023-10-21 PROCEDURE — 2500000004 HC RX 250 GENERAL PHARMACY W/ HCPCS (ALT 636 FOR OP/ED)

## 2023-10-21 PROCEDURE — 2500000004 HC RX 250 GENERAL PHARMACY W/ HCPCS (ALT 636 FOR OP/ED): Performed by: ANESTHESIOLOGIST ASSISTANT

## 2023-10-21 PROCEDURE — A59514 PR CESAREAN DELIVERY ONLY: Performed by: ANESTHESIOLOGIST ASSISTANT

## 2023-10-21 PROCEDURE — 2500000001 HC RX 250 WO HCPCS SELF ADMINISTERED DRUGS (ALT 637 FOR MEDICARE OP): Performed by: ANESTHESIOLOGIST ASSISTANT

## 2023-10-21 PROCEDURE — 1100000001 HC PRIVATE ROOM DAILY

## 2023-10-21 PROCEDURE — 2500000004 HC RX 250 GENERAL PHARMACY W/ HCPCS (ALT 636 FOR OP/ED): Performed by: STUDENT IN AN ORGANIZED HEALTH CARE EDUCATION/TRAINING PROGRAM

## 2023-10-21 PROCEDURE — 2500000005 HC RX 250 GENERAL PHARMACY W/O HCPCS: Performed by: ANESTHESIOLOGIST ASSISTANT

## 2023-10-21 PROCEDURE — 85730 THROMBOPLASTIN TIME PARTIAL: CPT | Mod: CMCLAB | Performed by: STUDENT IN AN ORGANIZED HEALTH CARE EDUCATION/TRAINING PROGRAM

## 2023-10-21 PROCEDURE — 85384 FIBRINOGEN ACTIVITY: CPT | Performed by: STUDENT IN AN ORGANIZED HEALTH CARE EDUCATION/TRAINING PROGRAM

## 2023-10-21 PROCEDURE — 59514 CESAREAN DELIVERY ONLY: CPT | Performed by: OBSTETRICS & GYNECOLOGY

## 2023-10-21 PROCEDURE — 88307 TISSUE EXAM BY PATHOLOGIST: CPT | Performed by: STUDENT IN AN ORGANIZED HEALTH CARE EDUCATION/TRAINING PROGRAM

## 2023-10-21 PROCEDURE — 3700000018 HC OB ANESTHESIA C-SECTION: Performed by: OBSTETRICS & GYNECOLOGY

## 2023-10-21 PROCEDURE — 36415 COLL VENOUS BLD VENIPUNCTURE: CPT | Mod: CMCLAB | Performed by: STUDENT IN AN ORGANIZED HEALTH CARE EDUCATION/TRAINING PROGRAM

## 2023-10-21 RX ORDER — SIMETHICONE 80 MG
80 TABLET,CHEWABLE ORAL 4 TIMES DAILY PRN
Status: DISCONTINUED | OUTPATIENT
Start: 2023-10-21 | End: 2023-10-25 | Stop reason: HOSPADM

## 2023-10-21 RX ORDER — OXYCODONE HYDROCHLORIDE 5 MG/1
5 TABLET ORAL EVERY 4 HOURS PRN
Status: DISCONTINUED | OUTPATIENT
Start: 2023-10-22 | End: 2023-10-25 | Stop reason: HOSPADM

## 2023-10-21 RX ORDER — SODIUM CHLORIDE, SODIUM LACTATE, POTASSIUM CHLORIDE, CALCIUM CHLORIDE 600; 310; 30; 20 MG/100ML; MG/100ML; MG/100ML; MG/100ML
INJECTION, SOLUTION INTRAVENOUS CONTINUOUS PRN
Status: DISCONTINUED | OUTPATIENT
Start: 2023-10-21 | End: 2023-10-21

## 2023-10-21 RX ORDER — MORPHINE SULFATE 0.5 MG/ML
INJECTION, SOLUTION EPIDURAL; INTRATHECAL; INTRAVENOUS AS NEEDED
Status: DISCONTINUED | OUTPATIENT
Start: 2023-10-21 | End: 2023-10-21

## 2023-10-21 RX ORDER — OXYTOCIN 10 [USP'U]/ML
10 INJECTION, SOLUTION INTRAMUSCULAR; INTRAVENOUS ONCE AS NEEDED
Status: DISCONTINUED | OUTPATIENT
Start: 2023-10-21 | End: 2023-10-25 | Stop reason: HOSPADM

## 2023-10-21 RX ORDER — LIDOCAINE HCL/EPINEPHRINE/PF 2%-1:200K
VIAL (ML) INJECTION AS NEEDED
Status: DISCONTINUED | OUTPATIENT
Start: 2023-10-21 | End: 2023-10-21

## 2023-10-21 RX ORDER — HYDROMORPHONE HYDROCHLORIDE 1 MG/ML
0.2 INJECTION, SOLUTION INTRAMUSCULAR; INTRAVENOUS; SUBCUTANEOUS EVERY 5 MIN PRN
Status: DISCONTINUED | OUTPATIENT
Start: 2023-10-21 | End: 2023-10-25 | Stop reason: HOSPADM

## 2023-10-21 RX ORDER — DIPHENHYDRAMINE HCL 25 MG
25 CAPSULE ORAL EVERY 4 HOURS PRN
Status: DISCONTINUED | OUTPATIENT
Start: 2023-10-21 | End: 2023-10-25 | Stop reason: HOSPADM

## 2023-10-21 RX ORDER — LIDOCAINE HYDROCHLORIDE AND EPINEPHRINE 15; 5 MG/ML; UG/ML
INJECTION, SOLUTION EPIDURAL AS NEEDED
Status: DISCONTINUED | OUTPATIENT
Start: 2023-10-21 | End: 2023-10-21

## 2023-10-21 RX ORDER — MISOPROSTOL 200 UG/1
800 TABLET ORAL ONCE AS NEEDED
Status: DISCONTINUED | OUTPATIENT
Start: 2023-10-21 | End: 2023-10-25 | Stop reason: HOSPADM

## 2023-10-21 RX ORDER — DIPHENHYDRAMINE HYDROCHLORIDE 50 MG/ML
25 INJECTION INTRAMUSCULAR; INTRAVENOUS EVERY 4 HOURS PRN
Status: DISCONTINUED | OUTPATIENT
Start: 2023-10-21 | End: 2023-10-25 | Stop reason: HOSPADM

## 2023-10-21 RX ORDER — NALBUPHINE HYDROCHLORIDE 10 MG/ML
5 INJECTION, SOLUTION INTRAMUSCULAR; INTRAVENOUS; SUBCUTANEOUS
Status: ACTIVE | OUTPATIENT
Start: 2023-10-21 | End: 2023-10-22

## 2023-10-21 RX ORDER — OXYCODONE HYDROCHLORIDE 5 MG/1
10 TABLET ORAL EVERY 4 HOURS PRN
Status: DISCONTINUED | OUTPATIENT
Start: 2023-10-22 | End: 2023-10-25 | Stop reason: HOSPADM

## 2023-10-21 RX ORDER — PHENYLEPHRINE 10 MG/250 ML(40 MCG/ML)IN 0.9 % SOD.CHLORIDE INTRAVENOUS
CONTINUOUS PRN
Status: DISCONTINUED | OUTPATIENT
Start: 2023-10-21 | End: 2023-10-21

## 2023-10-21 RX ORDER — ACETAMINOPHEN 325 MG/1
975 TABLET ORAL ONCE
Status: COMPLETED | OUTPATIENT
Start: 2023-10-21 | End: 2023-10-21

## 2023-10-21 RX ORDER — ONDANSETRON 4 MG/1
4 TABLET, FILM COATED ORAL EVERY 6 HOURS PRN
Status: DISCONTINUED | OUTPATIENT
Start: 2023-10-21 | End: 2023-10-25 | Stop reason: HOSPADM

## 2023-10-21 RX ORDER — CEFAZOLIN 1 G/1
INJECTION, POWDER, FOR SOLUTION INTRAVENOUS AS NEEDED
Status: DISCONTINUED | OUTPATIENT
Start: 2023-10-21 | End: 2023-10-21

## 2023-10-21 RX ORDER — DEXAMETHASONE SODIUM PHOSPHATE 4 MG/ML
INJECTION, SOLUTION INTRA-ARTICULAR; INTRALESIONAL; INTRAMUSCULAR; INTRAVENOUS; SOFT TISSUE AS NEEDED
Status: DISCONTINUED | OUTPATIENT
Start: 2023-10-21 | End: 2023-10-21

## 2023-10-21 RX ORDER — FAMOTIDINE 10 MG/ML
INJECTION INTRAVENOUS AS NEEDED
Status: DISCONTINUED | OUTPATIENT
Start: 2023-10-21 | End: 2023-10-21

## 2023-10-21 RX ORDER — CARBOPROST TROMETHAMINE 250 UG/ML
250 INJECTION, SOLUTION INTRAMUSCULAR ONCE AS NEEDED
Status: DISCONTINUED | OUTPATIENT
Start: 2023-10-21 | End: 2023-10-25 | Stop reason: HOSPADM

## 2023-10-21 RX ORDER — LABETALOL HYDROCHLORIDE 5 MG/ML
20 INJECTION, SOLUTION INTRAVENOUS ONCE AS NEEDED
Status: DISCONTINUED | OUTPATIENT
Start: 2023-10-21 | End: 2023-10-25 | Stop reason: HOSPADM

## 2023-10-21 RX ORDER — TERBUTALINE SULFATE 1 MG/ML
0.25 INJECTION SUBCUTANEOUS ONCE
Status: DISCONTINUED | OUTPATIENT
Start: 2023-10-21 | End: 2023-10-22

## 2023-10-21 RX ORDER — HYDRALAZINE HYDROCHLORIDE 20 MG/ML
5 INJECTION INTRAMUSCULAR; INTRAVENOUS ONCE AS NEEDED
Status: DISCONTINUED | OUTPATIENT
Start: 2023-10-21 | End: 2023-10-25 | Stop reason: HOSPADM

## 2023-10-21 RX ORDER — NIFEDIPINE 10 MG/1
10 CAPSULE ORAL ONCE AS NEEDED
Status: DISCONTINUED | OUTPATIENT
Start: 2023-10-21 | End: 2023-10-25 | Stop reason: HOSPADM

## 2023-10-21 RX ORDER — ACETAMINOPHEN 325 MG/1
650 TABLET ORAL ONCE
Status: DISCONTINUED | OUTPATIENT
Start: 2023-10-21 | End: 2023-10-21

## 2023-10-21 RX ORDER — OXYTOCIN/0.9 % SODIUM CHLORIDE 30/500 ML
60 PLASTIC BAG, INJECTION (ML) INTRAVENOUS ONCE AS NEEDED
Status: DISCONTINUED | OUTPATIENT
Start: 2023-10-21 | End: 2023-10-25 | Stop reason: HOSPADM

## 2023-10-21 RX ORDER — KETOROLAC TROMETHAMINE 30 MG/ML
INJECTION, SOLUTION INTRAMUSCULAR; INTRAVENOUS AS NEEDED
Status: DISCONTINUED | OUTPATIENT
Start: 2023-10-21 | End: 2023-10-21

## 2023-10-21 RX ORDER — KETOROLAC TROMETHAMINE 30 MG/ML
30 INJECTION, SOLUTION INTRAMUSCULAR; INTRAVENOUS EVERY 6 HOURS
Status: DISPENSED | OUTPATIENT
Start: 2023-10-21 | End: 2023-10-22

## 2023-10-21 RX ORDER — ACETAMINOPHEN 325 MG/1
975 TABLET ORAL EVERY 6 HOURS SCHEDULED
Status: DISCONTINUED | OUTPATIENT
Start: 2023-10-21 | End: 2023-10-25 | Stop reason: HOSPADM

## 2023-10-21 RX ORDER — ONDANSETRON HYDROCHLORIDE 2 MG/ML
4 INJECTION, SOLUTION INTRAVENOUS EVERY 6 HOURS PRN
Status: DISCONTINUED | OUTPATIENT
Start: 2023-10-21 | End: 2023-10-25 | Stop reason: HOSPADM

## 2023-10-21 RX ORDER — DEXMEDETOMIDINE IN 0.9 % NACL 20 MCG/5ML
SYRINGE (ML) INTRAVENOUS AS NEEDED
Status: DISCONTINUED | OUTPATIENT
Start: 2023-10-21 | End: 2023-10-21

## 2023-10-21 RX ORDER — BUPIVACAINE HYDROCHLORIDE 7.5 MG/ML
INJECTION, SOLUTION EPIDURAL; RETROBULBAR AS NEEDED
Status: DISCONTINUED | OUTPATIENT
Start: 2023-10-21 | End: 2023-10-21

## 2023-10-21 RX ORDER — ENOXAPARIN SODIUM 100 MG/ML
40 INJECTION SUBCUTANEOUS EVERY 24 HOURS
Status: DISCONTINUED | OUTPATIENT
Start: 2023-10-21 | End: 2023-10-25 | Stop reason: HOSPADM

## 2023-10-21 RX ORDER — BISACODYL 10 MG/1
10 SUPPOSITORY RECTAL DAILY PRN
Status: DISCONTINUED | OUTPATIENT
Start: 2023-10-21 | End: 2023-10-25 | Stop reason: HOSPADM

## 2023-10-21 RX ORDER — NALOXONE HYDROCHLORIDE 0.4 MG/ML
0.1 INJECTION, SOLUTION INTRAMUSCULAR; INTRAVENOUS; SUBCUTANEOUS EVERY 5 MIN PRN
Status: DISCONTINUED | OUTPATIENT
Start: 2023-10-21 | End: 2023-10-25 | Stop reason: HOSPADM

## 2023-10-21 RX ORDER — IBUPROFEN 600 MG/1
600 TABLET ORAL EVERY 6 HOURS
Status: DISCONTINUED | OUTPATIENT
Start: 2023-10-22 | End: 2023-10-25 | Stop reason: HOSPADM

## 2023-10-21 RX ORDER — TRANEXAMIC ACID 100 MG/ML
1000 INJECTION, SOLUTION INTRAVENOUS ONCE AS NEEDED
Status: DISCONTINUED | OUTPATIENT
Start: 2023-10-21 | End: 2023-10-25 | Stop reason: HOSPADM

## 2023-10-21 RX ORDER — POLYETHYLENE GLYCOL 3350 17 G/17G
17 POWDER, FOR SOLUTION ORAL 2 TIMES DAILY PRN
Status: DISCONTINUED | OUTPATIENT
Start: 2023-10-21 | End: 2023-10-25 | Stop reason: HOSPADM

## 2023-10-21 RX ORDER — METHYLERGONOVINE MALEATE 0.2 MG/ML
0.2 INJECTION INTRAVENOUS ONCE AS NEEDED
Status: DISCONTINUED | OUTPATIENT
Start: 2023-10-21 | End: 2023-10-25 | Stop reason: HOSPADM

## 2023-10-21 RX ORDER — LIDOCAINE 560 MG/1
1 PATCH PERCUTANEOUS; TOPICAL; TRANSDERMAL
Status: DISCONTINUED | OUTPATIENT
Start: 2023-10-21 | End: 2023-10-25 | Stop reason: HOSPADM

## 2023-10-21 RX ORDER — LOPERAMIDE HYDROCHLORIDE 2 MG/1
4 CAPSULE ORAL EVERY 2 HOUR PRN
Status: DISCONTINUED | OUTPATIENT
Start: 2023-10-21 | End: 2023-10-25 | Stop reason: HOSPADM

## 2023-10-21 RX ORDER — SODIUM CITRATE AND CITRIC ACID MONOHYDRATE 334; 500 MG/5ML; MG/5ML
SOLUTION ORAL AS NEEDED
Status: DISCONTINUED | OUTPATIENT
Start: 2023-10-21 | End: 2023-10-21

## 2023-10-21 RX ORDER — ADHESIVE BANDAGE
10 BANDAGE TOPICAL
Status: DISCONTINUED | OUTPATIENT
Start: 2023-10-21 | End: 2023-10-25 | Stop reason: HOSPADM

## 2023-10-21 RX ADMIN — ONDANSETRON 4 MG: 2 INJECTION INTRAMUSCULAR; INTRAVENOUS at 05:49

## 2023-10-21 RX ADMIN — ACETAMINOPHEN 975 MG: 325 TABLET ORAL at 22:20

## 2023-10-21 RX ADMIN — LIDOCAINE HYDROCHLORIDE,EPINEPHRINE BITARTRATE 5 ML: 20; .005 INJECTION, SOLUTION EPIDURAL; INFILTRATION; INTRACAUDAL; PERINEURAL at 06:28

## 2023-10-21 RX ADMIN — CEFAZOLIN 2 G: 1 INJECTION, POWDER, FOR SOLUTION INTRAMUSCULAR; INTRAVENOUS at 05:59

## 2023-10-21 RX ADMIN — DEXAMETHASONE SODIUM PHOSPHATE 4 MG: 4 INJECTION INTRA-ARTICULAR; INTRALESIONAL; INTRAMUSCULAR; INTRAVENOUS; SOFT TISSUE at 05:49

## 2023-10-21 RX ADMIN — METOCLOPRAMIDE 10 MG: 5 INJECTION, SOLUTION INTRAMUSCULAR; INTRAVENOUS at 05:49

## 2023-10-21 RX ADMIN — Medication 8 MCG: at 06:28

## 2023-10-21 RX ADMIN — Medication 8 MCG: at 06:21

## 2023-10-21 RX ADMIN — KETOROLAC TROMETHAMINE 30 MG: 30 INJECTION, SOLUTION INTRAMUSCULAR; INTRAVENOUS at 16:22

## 2023-10-21 RX ADMIN — OXYTOCIN 600 MILLI-UNITS/MIN: 10 INJECTION, SOLUTION INTRAMUSCULAR; INTRAVENOUS at 06:25

## 2023-10-21 RX ADMIN — LIDOCAINE HYDROCHLORIDE,EPINEPHRINE BITARTRATE 4 ML: 20; .005 INJECTION, SOLUTION EPIDURAL; INFILTRATION; INTRACAUDAL; PERINEURAL at 06:57

## 2023-10-21 RX ADMIN — ACETAMINOPHEN 975 MG: 325 TABLET ORAL at 16:22

## 2023-10-21 RX ADMIN — Medication 0.68 MCG/KG/MIN: at 05:43

## 2023-10-21 RX ADMIN — KETOROLAC TROMETHAMINE 30 MG: 30 INJECTION, SOLUTION INTRAMUSCULAR at 07:02

## 2023-10-21 RX ADMIN — SODIUM CITRATE AND CITRIC ACID MONOHYDRATE 30 ML: 500; 334 SOLUTION ORAL at 05:14

## 2023-10-21 RX ADMIN — MORPHINE SULFATE 2 MG: 0.5 INJECTION EPIDURAL; INTRATHECAL; INTRAVENOUS at 06:37

## 2023-10-21 RX ADMIN — SODIUM CHLORIDE, POTASSIUM CHLORIDE, SODIUM LACTATE AND CALCIUM CHLORIDE: 600; 310; 30; 20 INJECTION, SOLUTION INTRAVENOUS at 06:49

## 2023-10-21 RX ADMIN — FAMOTIDINE 20 MG: 10 INJECTION INTRAVENOUS at 05:14

## 2023-10-21 RX ADMIN — BUPIVACAINE HYDROCHLORIDE 1.4 ML: 7.5 INJECTION, SOLUTION EPIDURAL; RETROBULBAR at 05:43

## 2023-10-21 RX ADMIN — LIDOCAINE HYDROCHLORIDE,EPINEPHRINE BITARTRATE 2 ML: 20; .005 INJECTION, SOLUTION EPIDURAL; INFILTRATION; INTRACAUDAL; PERINEURAL at 06:40

## 2023-10-21 RX ADMIN — ACETAMINOPHEN 975 MG: 325 TABLET ORAL at 01:38

## 2023-10-21 RX ADMIN — Medication 4 MCG: at 06:32

## 2023-10-21 RX ADMIN — MORPHINE SULFATE 3 MG: 0.5 INJECTION, SOLUTION EPIDURAL; INTRATHECAL; INTRAVENOUS at 06:48

## 2023-10-21 RX ADMIN — KETOROLAC TROMETHAMINE 30 MG: 30 INJECTION, SOLUTION INTRAMUSCULAR; INTRAVENOUS at 22:19

## 2023-10-21 RX ADMIN — SODIUM CHLORIDE, POTASSIUM CHLORIDE, SODIUM LACTATE AND CALCIUM CHLORIDE: 600; 310; 30; 20 INJECTION, SOLUTION INTRAVENOUS at 06:04

## 2023-10-21 RX ADMIN — SODIUM CHLORIDE, POTASSIUM CHLORIDE, SODIUM LACTATE AND CALCIUM CHLORIDE: 600; 310; 30; 20 INJECTION, SOLUTION INTRAVENOUS at 05:14

## 2023-10-21 RX ADMIN — LIDOCAINE HYDROCHLORIDE AND EPINEPHRINE 4 ML: 15; 5 INJECTION, SOLUTION EPIDURAL at 06:23

## 2023-10-21 SDOH — HEALTH STABILITY: MENTAL HEALTH: HAVE YOU USED ANY PRESCRIPTION DRUGS OTHER THAN PRESCRIBED IN THE PAST 12 MONTHS?: NO

## 2023-10-21 SDOH — SOCIAL STABILITY: SOCIAL INSECURITY: HAVE YOU HAD THOUGHTS OF HARMING ANYONE ELSE?: YES

## 2023-10-21 SDOH — HEALTH STABILITY: MENTAL HEALTH: WERE YOU ABLE TO COMPLETE ALL THE BEHAVIORAL HEALTH SCREENINGS?: YES

## 2023-10-21 SDOH — HEALTH STABILITY: MENTAL HEALTH: SUICIDAL BEHAVIOR (LIFETIME): NO

## 2023-10-21 SDOH — HEALTH STABILITY: MENTAL HEALTH: NON-SPECIFIC ACTIVE SUICIDAL THOUGHTS (PAST 1 MONTH): NO

## 2023-10-21 SDOH — SOCIAL STABILITY: SOCIAL INSECURITY: ARE YOU OR HAVE YOU BEEN THREATENED OR ABUSED PHYSICALLY, EMOTIONALLY, OR SEXUALLY BY ANYONE?: NO

## 2023-10-21 SDOH — SOCIAL STABILITY: SOCIAL INSECURITY: ARE THERE ANY APPARENT SIGNS OF INJURIES/BEHAVIORS THAT COULD BE RELATED TO ABUSE/NEGLECT?: NO

## 2023-10-21 SDOH — SOCIAL STABILITY: SOCIAL INSECURITY: PHYSICAL ABUSE: DENIES

## 2023-10-21 SDOH — HEALTH STABILITY: MENTAL HEALTH: HAVE YOU USED ANY SUBSTANCES (CANABIS, COCAINE, HEROIN, HALLUCINOGENS, INHALANTS, ETC.) IN THE PAST 12 MONTHS?: NO

## 2023-10-21 SDOH — ECONOMIC STABILITY: HOUSING INSECURITY: DO YOU FEEL UNSAFE GOING BACK TO THE PLACE WHERE YOU ARE LIVING?: NO

## 2023-10-21 SDOH — HEALTH STABILITY: MENTAL HEALTH: CURRENT SMOKER: 0

## 2023-10-21 SDOH — HEALTH STABILITY: MENTAL HEALTH: WISH TO BE DEAD (PAST 1 MONTH): NO

## 2023-10-21 SDOH — SOCIAL STABILITY: SOCIAL INSECURITY: DO YOU FEEL ANYONE HAS EXPLOITED OR TAKEN ADVANTAGE OF YOU FINANCIALLY OR OF YOUR PERSONAL PROPERTY?: NO

## 2023-10-21 SDOH — SOCIAL STABILITY: SOCIAL INSECURITY: ABUSE SCREEN: ADULT

## 2023-10-21 SDOH — SOCIAL STABILITY: SOCIAL INSECURITY: VERBAL ABUSE: DENIES

## 2023-10-21 SDOH — SOCIAL STABILITY: SOCIAL INSECURITY: HAS ANYONE EVER THREATENED TO HURT YOUR FAMILY OR YOUR PETS?: NO

## 2023-10-21 SDOH — SOCIAL STABILITY: SOCIAL INSECURITY: DOES ANYONE TRY TO KEEP YOU FROM HAVING/CONTACTING OTHER FRIENDS OR DOING THINGS OUTSIDE YOUR HOME?: NO

## 2023-10-21 ASSESSMENT — PAIN SCALES - GENERAL
PAINLEVEL_OUTOF10: 7
PAINLEVEL_OUTOF10: 0 - NO PAIN
PAINLEVEL_OUTOF10: 5 - MODERATE PAIN
PAINLEVEL_OUTOF10: 0 - NO PAIN
PAINLEVEL_OUTOF10: 6
PAINLEVEL_OUTOF10: 0 - NO PAIN
PAINLEVEL_OUTOF10: 4
PAINLEVEL_OUTOF10: 4
PAIN_LEVEL: 0
PAINLEVEL_OUTOF10: 0 - NO PAIN
PAINLEVEL_OUTOF10: 5 - MODERATE PAIN
PAINLEVEL_OUTOF10: 0 - NO PAIN
PAINLEVEL_OUTOF10: 4
PAINLEVEL_OUTOF10: 3
PAINLEVEL_OUTOF10: 2

## 2023-10-21 ASSESSMENT — LIFESTYLE VARIABLES
SKIP TO QUESTIONS 9-10: 1
AUDIT-C TOTAL SCORE: 0
HOW MANY STANDARD DRINKS CONTAINING ALCOHOL DO YOU HAVE ON A TYPICAL DAY: PATIENT DOES NOT DRINK
HOW OFTEN DO YOU HAVE A DRINK CONTAINING ALCOHOL: NEVER
HOW OFTEN DO YOU HAVE 6 OR MORE DRINKS ON ONE OCCASION: NEVER
AUDIT-C TOTAL SCORE: 0

## 2023-10-21 ASSESSMENT — PATIENT HEALTH QUESTIONNAIRE - PHQ9
1. LITTLE INTEREST OR PLEASURE IN DOING THINGS: NOT AT ALL
SUM OF ALL RESPONSES TO PHQ9 QUESTIONS 1 & 2: 0
2. FEELING DOWN, DEPRESSED OR HOPELESS: NOT AT ALL

## 2023-10-21 ASSESSMENT — ACTIVITIES OF DAILY LIVING (ADL): LACK_OF_TRANSPORTATION: NO

## 2023-10-21 NOTE — POST-PROCEDURE NOTE
I was present during all critical and key portions of the procedure(s) and immediately available to furnish services the entire duration.  See resident note for details.     Tawana Beard MD

## 2023-10-21 NOTE — INDIVIDUALIZED OVERALL PLAN OF CARE NOTE
To bedside for report of vaginal bleeding when sitting up from bed. Patient having very mild abdominal pain,    VS wnl    SSE: 10cc of dark blood in the vaignal vault, additional dark blood noted on pad.  SVE: ft/l/h  FHT: 120/mod/+accel/-decel  TOCO: irritable  Abd: mild tenderness to palpation over lower abdomen  BSUS: breech presentation, no obvious placental abnormalities    A/P: Tamia Eisenberg is a 32 y.o.  at 37w2d who is s/p unsucessful ECV, now with c/f abruption.    - SSE with significant bleeding, no bright red blood.   - suspect abruption in the setting of recent ECV  - Given new bleeding, would recommend delivery at this time. For nonscheduled nonurgent CS for abruption and breech malpresentation  - NPO time 7pm  - FHT Cat I, for CEFM  - discussed possibility of need for urgent cs pending fetal status  - cbc, coags, and fibrinogen sent  - T&C x1U    Discussed with Dr. Lana Castillo MD

## 2023-10-21 NOTE — LACTATION NOTE
Lactation Consultant Note  Lactation Consultation  Reason for Consult: Initial assessment    Maternal Information  Has mother  before?: No  Infant to breast within first 2 hours of birth?: Yes  Exclusive Pump and Bottle Feed: No    Maternal Assessment  Breast Assessment: Medium, Symmetrical, Soft  Nipple Assessment: Intact, Erect  Areola Assessment: Normal    Infant Assessment  Infant Behavior: Sleepy  Infant Assessment: Good cupping of tongue, Good lateral movement of tongue    Feeding Assessment  Nutrition Source: Breastmilk  Feeding Method: Nursing at the breast  Feeding Position: Football/seated, Cross - cradle  Suck/Feeding: Unsustained, Tactile stimulation needed  Latch Assessment: Maximum assistance is needed, Latch achieved after repeated attempts    LATCH TOOL  Latch: Repeated attempts, hold nipple in mouth, stimulate to suck  Audible Swallowing: None  Type of Nipple: Everted (After stimulation)  Comfort (Breast/Nipple): Soft/non-tender  Hold (Positioning): Full assist, staff holds infant at breast  LATCH Score: 5    Breast Pump       Other OB Lactation Tools       Patient Follow-up  Outpatient Lactation Follow-up: Recommended    Other OB Lactation Documentation  Maternal Risk Factors: Age over 30, primiparity    Recommendations/Summary  This baby was only a few hours old at the time of our visit. Mom stated that baby had not yet latched successfully on labor and delivery and she wanted to make sure that baby was able to feed and transfer enough milk. Discussed typical feeding behavior during the first and second 24 hours of life. Showed mom how to express colostrum and she was easily expressible. Mom was holding baby skin to skin in a position similar to a koala hold when I entered the room. Baby was latched but not sucking. I asked mom if this position was comfortable for her and she said no and was interested in trying other positions. We switched baby to a football hold at the right breast. Mom  was able to express some colostrum to attempt to entice the latch. Baby opened his mouth wide a few times and was able to attain a moderately deep latch. He sucked for a couple of minutes but then would fall asleep and unlatch. We switched baby to cross cradle hold still at the right breast to show mom a different position and to help baby wake up. Baby was too sleepy to feed in this position. Placed him skin to skin with mom. Discussed benefits of skin to skin contact for mom and baby and for mom's milk production and supply. Reviewed the importance of attempting to latch every 2-3 hours/8-12 times in 24 hours. Encouraged mom to call out for assistance with future feeds. Mom has a pump for at home. We reviewed the outpatient lactation information.

## 2023-10-21 NOTE — DISCHARGE INSTRUCTIONS
“The American Academy of Pediatrics recommends that pacifier use is best avoided during the initiation of breastfeeding and used only after breastfeeding is well established.  In some infants, early pacifier use may interfere with establishment of good breastfeeding practices, whereas in others it may indicate the presence of a breastfeeding problem that requires intervention.  Use of a pacifier may cause problems with latching, and lead to decreased milk supply by missing feeding opportunities.  Pacifiers may be used during painful procedures, but are not otherwise recommended while the infant is learning to breastfeed.”     Any woman can have complications after the birth of a baby including a blood clot, a heart problem, hypertensive disorder/eclampsia, depression, hemorrhage, or infection. Notify all providers of your delivery date up to one year after birth.*       Call 911 or go to nearest emergency room right away if you have: PAIN or pressure in chest; OBSTRUCTED breathing or shortness of breath; SEIZURES; THOUGHTS of hurting yourself or your baby; heart palpitations/racing; change in alertness/confusion.    Call your provider if you have: BLEEDING, soaking through a pad/hour, or blood clots the size of an egg or bigger; INCISION (episiotomy stitches or  site) that is not healing (increased redness, pain, drainage/pus, or separation); RED or swollen leg/calf that is painful or warm to touch, especially in one leg more than the other; TEMPERATURE of 100.4 F or higher or chills; HEADACHE that does not get better with medicine, rest or hydration, or bad headache with vision changes like spots or flashing lights; increased swelling of face, hands or legs; severe cramps or upper right belly pain; red or swollen breast that is painful or warm to touch; an unusual, foul odor from your vaginal discharge; pain, burning, or difficulty during urination; severe constipation (more than 5 days); feelings of  depression (such as depressed mood, loss of interest in enjoyable things, unable to care for yourself, trouble sleeping, lack of appetite, or feeling worthless).     If you can’t reach your provider or symptoms worsen, call 911 or go to nearest emergency room.   *Information obtained from [facility]’s: Save Your Life: Get Care for These POST-BIRTH Warning Signs

## 2023-10-21 NOTE — ANESTHESIA PROCEDURE NOTES
CSE Block    Patient location during procedure: OR  Start time: 10/21/2023 5:25 AM  End time: 10/21/2023 5:47 AM  Reason for block: primary anesthetic}  Staffing  Performed: attending   Authorized by: MICHAEL Burgess    Performed by: MICHAEL Burgess    Preanesthetic Checklist  Completed: patient identified, IV checked, risks and benefits discussed, surgical consent, monitors and equipment checked, pre-op evaluation, timeout performed and sterile techniques followed  Block Timeout  RN/Licensed healthcare professional reads aloud to the Anesthesia provider and entire team: Patient identity, procedure with side and site, patient position, and as applicable the availability of implants/special equipment/special requirements.  Patient on coagulant treatment: no  Timeout performed at: 10/21/2023 5:26 AM    CSE Block  Patient position: sitting  Prep: ChloraPrep  Sterility prep: cap, drape, gloves and mask  Sedation level: no sedation  Patient monitoring: heart rate, continuous pulse oximetry and blood pressure  Approach: midline  Local numbing: lidocaine 1% to skin and subcutaneous tissues  Vertebral space: lumbar  L3-4  Guidance: landmark technique    Epidural Needle  LUIS MIGUEL technique: saline  Needle type: Tuohy   Needle gauge: 17 G  Needle insertion depth: 7 cm  Spinal Needle  Needle type: pencil-point   Needle gauge: 24G  Free flow CSF: yes  Epidural Catheter  Catheter type: multi-orifice  Catheter size: 19 G  Catheter at skin depth: 12 cm  Catheter securement method: clear occlusive dressing and liquid medical adhesive            Additional Notes  Paresthesia with spinal needle x2 - unable to thread catheter.  Up 1 level. Successful CSE

## 2023-10-21 NOTE — ANESTHESIA POSTPROCEDURE EVALUATION
Patient: Tamia Eisenberg    Procedure Summary       Date: 10/21/23 Room / Location: [facility] / [facility]    Anesthesia Start: 514 Anesthesia Stop:     Procedure: OBGYN Delivery  Section Diagnosis: (Breech)    Surgeons: Tawana Beard MD Responsible Provider: MICHAEL Burgess    Anesthesia Type: CSE ASA Status: 2            Anesthesia Type: CSE    Vitals Value Taken Time   /58 10/21/23 0742   Temp - 10/21/23 0742   Pulse 93 10/21/23 0742   Resp 16 10/21/23 0742   SpO2 97 10/21/23 0742       Anesthesia Post Evaluation    Patient location during evaluation: bedside  Patient participation: complete - patient cannot participate  Level of consciousness: awake  Pain score: 0  Airway patency: patent  Cardiovascular status: acceptable  Respiratory status: acceptable  Hydration status: acceptable        No notable events documented.

## 2023-10-21 NOTE — L&D DELIVERY NOTE
OB Delivery Note  10/21/2023  Tamia Eisenberg  32 y.o.     Gestational Age: 37w2d  /Para:   Estimated Blood Loss:   Delivery Blood Loss  10/20/23 1308 - 10/21/23 0751      None            Quantitative Blood Loss: Admission to Discharge: 575 mL (10/20/2023  1:08 PM - 10/21/2023  9:26 AM)    Ivy Eisenberg [[id number]]      Labor Events    Sac identifier: Sac 1  Rupture date/time: 10/21/2023 0622  Rupture type: Artificial  Fluid color: Clear  Fluid odor: None  Labor type:  Without Labor  Labor allowed to proceed with plans for an attempted vaginal birth?: No  Complications: Abruptio Placenta       Labor Event Times    Labor onset date/time: 10/20/2023 1308       Labor Length    3rd stage: 0h 02m       Placenta    Placenta delivery date/time: 10/21/2023 0626  Placenta removal: Manual removal  Placenta appearance: Intact  Placenta disposition: pathology       Cord    Vessels: 3 vessels  Complications: None  Delayed cord clamping?: Yes  Cord clamped date/time: 10/21/2023 0624  Cord blood disposition: Discarded  Gases sent?: No       Lacerations    Episiotomy: None       Anesthesia    Method: Combined spinal-epidural       Operative Delivery    Forceps attempted?: No  Vacuum extractor attempted?: No       Shoulder Dystocia    Shoulder dystocia present?: No       [place] Delivery    Birth date/time: 10/[birth year]/2023 06:24:00  Delivery type: , Low Transverse   categorization: primary   priority: routine  Indications for : Breech  Incision type: low transverse  Complications: Abruptio Placenta       Resuscitation    Method: None       Apgars    Living status: Living  Apgar Component Scores:  1 min.:  5 min.:  10 min.:  15 min.:  20 min.:    Skin color:  1  1       Heart rate:  2  2       Reflex irritability:  2  2       Muscle tone:  2  2       Respiratory effort:  2  2       Total:  9  9       Apgars assigned by: PAT GIBBONS       Delivery Providers    Delivering clinician:  Tawana Beard MD   Provider Role    Malinda Earl, RN Delivery Nurse    Mary Major, RN Nursery Nurse    Ksenia Spears MD Resident    Radha Rock, RN Delivery Nurse    Emilia Wilder MD Resident                 Emilia Wilder MD

## 2023-10-21 NOTE — CARE PLAN
The patient's goals for the shift include sucessful ECV    The clinical goals for the shift include healthy patient  Report given to A C-W RN at 0705. 10/21

## 2023-10-21 NOTE — ANESTHESIA PREPROCEDURE EVALUATION
Patient: Tamia Eisenberg    Evaluation Method: In-person visit    Procedure Information    Date: 10/21/23  Procedure: PLTCS     Pt s/p unsuccessful ECV with bright red vaginal bleeding during recovery, now for PLTCS     Relevant Problems   Anesthesia (within normal limits)      Cardiovascular  Palpitations 1 week ago lasted for 2 days, no longer having palpitations    (+) Hyperlipidemia      Endocrine   (+) Hypothyroidism in pregnancy, antepartum, third trimester      GI   (+) Gastroesophageal reflux disease      /Renal (within normal limits)      Neuro/Psych (within normal limits)      Pulmonary (within normal limits)      GI/Hepatic (within normal limits)      Hematology   (+) Benign gestational thrombocytopenia in third trimester (CMS/HCC)      Musculoskeletal (within normal limits)      Eyes, Ears, Nose, and Throat   (+) Vision loss       Clinical information reviewed:   Tobacco  Allergies  Meds  Problems  Med Hx  Surg Hx  OB Status    Fam Hx  Soc Hx        NPO Detail:  10/20 at 1900 - solid meal     OB/Gyn Evaluation    Present Pregnancy    Patient is pregnant now.   Obstetric History                Physical Exam    Airway  Mallampati: I  TM distance: >3 FB  Neck ROM: full     Cardiovascular - normal exam     Dental    Pulmonary - normal exam     Abdominal            Anesthesia Plan    ASA 2     CSE     The patient is not a current smoker.    Anesthetic plan and risks discussed with patient and spouse.  Use of blood products discussed with patient and spouse who.    Plan discussed with CRNA and attending.

## 2023-10-21 NOTE — OP NOTE
Date: 10/21/2023  OR Location: [facility]    Name: Tamia Eisenberg, : 1990, Age: 32 y.o., MRN: [id number], Sex: female    Diagnosis  * No Diagnosis Codes entered * * No Diagnosis Codes entered *     Procedures    * OBGYN Delivery  Section    Surgeons      * Tawana Beard - Primary    Resident/Fellow/Other Assistant:  Surgeon(s) and Role: Ksenia Spears MD First assist  Emilia Whittaker MD Second assist    Procedure Summary  Anesthesia: * No anesthesia type entered *  ASA: II  Anesthesia Staff: Anesthesiologist: Rom Perales MD; Devyn Souza MD  C-AA: MICHAEL Burgess  Anesthesia Resident: Hanh Rose MD  Estimated Blood Loss: 575 mL  Intra-op Medications:   Medication Name Total Dose   lactated Ringer's infusion 1,554.17 mL              Anesthesia Record               Intraprocedure I/O Totals          Intake    Oxytocin Drip 0.00 mL    The total shown is the total volume documented since Anesthesia Start was filed.    Phenylephrine Drip 0.00 mL    The total shown is the total volume documented since Anesthesia Start was filed.    lactated Ringer's infusion 1500.00 mL    Total Intake 1500 mL       Output    Urine 135 mL    Total Output 135 mL       Net    Net Volume 1365 mL          Specimen:   ID Type Source Tests Collected by Time   1 : placenta Tissue PLACENTA SINGLE SURGICAL PATHOLOGY EXAM Tawana Beard MD 10/21/2023 0618        Staff:   Nicholas Scrub: Olya Bone  Scrub Person: Lisbet Anderson         Informed Consent:  The risks, benefits, complications, and alternatives were discussed with the patient. The patient understood that the risks of  section include, but are not limited to: injury to nearby structures or organs, infection, blood loss and possible need for transfusion, and potential need for more surgery including hysterectomy. The patient stated understanding and desired to proceed. All questions were answered. The site of surgery was  properly noted and marked. The patient was identified as Tamia Eisenberg and the procedure verified as a  delivery. A Time Out was held and the above information confirmed.    Procedure Details:  Pre op diagnosis: Breech Malpresentation following failed ECV, Vaginal bleeding concerning for abruption.  Post op diagnosis: Same    Findings: Normal appearing gravid uterus, fallopian tubes, and ovaries. Amniotic fluid bloody, male infant in breech presentation    Procedure: Patient was taken to the OR where combined spinal epidural anesthesia was administered/epidural anesthesia was dosed.  She was then placed in the dorsal supine position with a left lateral tilt. A bautista catheter was placed, SCDs were applied, and a vaginal prep was performed. A pre-procedure time out was performed.  The patient was given prophylactic dose of IV antibiotics. She was then prepped and draped in the usual sterile fashion. A Pfannenstiel skin incision was made through the skin with the scalpel and then carried through the subcutaneous fat to the underlying fascial layer with sharp dissection. The fascia was incised on either side of the midline with the scalpel, and fascia was then dissected off the rectus muscle bilaterally using sharp dissection with curved Suárez scissors with clear visualization. The superior aspect of the incision was grasped, tented up with Kocher clamps and the rectus muscle was dissected off bluntly. The muscles were divided where it was noted that the division occurred right lateral to the midline. The correct plane was identified and the rectus muscles were divided in the midline, the peritoneum was identified and then entered bluntly, and incision extended superiorly and inferiorly with good visualization of bladder below. Bladder blade was inserted. A low transverse uterine incision was made with the scalpel, the uterine cavity was entered, and the hysterotomy was extended bilaterally with cephalocaudal  stretching. The infant was delivered atraumatically with normal breech manuevers, the cord was clamped and cut, and infant was handed off to the awaiting nursing staff. A cord segment and blood sample were collected. The placenta was then expressed. The uterus was exteriorized and cleared of all clot and debris. The uterine incision was repaired using a running stitch of 0-Vicryl. A second layer of the same suture was placed in an imbricating fashion. Good hemostasis was noted. The uterus was then placed back inside the pelvis, the gutters were cleared of all clots and debris. The hysterotomy was again evaluated and found to be hemostatic, and the underside of the fascia and bladder and the rectus muscles were also found to be hemostatic. The fascia was closed using a running stitch of 0-Vicryl. The subcutaneous layer was irrigated, small bleeding vessels were cauterized. The skin was closed with 3-0 Monocryl on a curved needle.  All counts were correct, the patient tolerated the procedure well. Dr. Beard was present for all key portions of the procedure. The patient and infant were taken back to the recovery room in stable condition.    Complications:  None; patient tolerated the procedure well.     Disposition: PACU - hemodynamically stable.  Condition: stable    Dr. Beard was here for the critical portions of the procedure.  Emilia Wilder MD PGY-1 OB/GYN

## 2023-10-22 LAB
ERYTHROCYTE [DISTWIDTH] IN BLOOD BY AUTOMATED COUNT: 14.6 % (ref 11.5–14.5)
HCT VFR BLD AUTO: 29.7 % (ref 36–46)
HGB BLD-MCNC: 9.4 G/DL (ref 12–16)
MCH RBC QN AUTO: 27.7 PG (ref 26–34)
MCHC RBC AUTO-ENTMCNC: 31.6 G/DL (ref 32–36)
MCV RBC AUTO: 88 FL (ref 80–100)
NRBC BLD-RTO: 0 /100 WBCS (ref 0–0)
PLATELET # BLD AUTO: 138 X10*3/UL (ref 150–450)
PMV BLD AUTO: 12.3 FL (ref 7.5–11.5)
RBC # BLD AUTO: 3.39 X10*6/UL (ref 4–5.2)
WBC # BLD AUTO: 12.1 X10*3/UL (ref 4.4–11.3)

## 2023-10-22 PROCEDURE — 96372 THER/PROPH/DIAG INJ SC/IM: CPT

## 2023-10-22 PROCEDURE — 85027 COMPLETE CBC AUTOMATED: CPT | Mod: CMCLAB | Performed by: STUDENT IN AN ORGANIZED HEALTH CARE EDUCATION/TRAINING PROGRAM

## 2023-10-22 PROCEDURE — 2500000001 HC RX 250 WO HCPCS SELF ADMINISTERED DRUGS (ALT 637 FOR MEDICARE OP)

## 2023-10-22 PROCEDURE — 2500000004 HC RX 250 GENERAL PHARMACY W/ HCPCS (ALT 636 FOR OP/ED)

## 2023-10-22 PROCEDURE — 36415 COLL VENOUS BLD VENIPUNCTURE: CPT | Mod: CMCLAB | Performed by: STUDENT IN AN ORGANIZED HEALTH CARE EDUCATION/TRAINING PROGRAM

## 2023-10-22 PROCEDURE — 1100000001 HC PRIVATE ROOM DAILY

## 2023-10-22 PROCEDURE — 36415 COLL VENOUS BLD VENIPUNCTURE: CPT | Performed by: STUDENT IN AN ORGANIZED HEALTH CARE EDUCATION/TRAINING PROGRAM

## 2023-10-22 PROCEDURE — 2500000001 HC RX 250 WO HCPCS SELF ADMINISTERED DRUGS (ALT 637 FOR MEDICARE OP): Performed by: STUDENT IN AN ORGANIZED HEALTH CARE EDUCATION/TRAINING PROGRAM

## 2023-10-22 RX ADMIN — IBUPROFEN 600 MG: 600 TABLET, FILM COATED ORAL at 23:52

## 2023-10-22 RX ADMIN — ACETAMINOPHEN 975 MG: 325 TABLET ORAL at 11:18

## 2023-10-22 RX ADMIN — ACETAMINOPHEN 975 MG: 325 TABLET ORAL at 04:59

## 2023-10-22 RX ADMIN — ACETAMINOPHEN 975 MG: 325 TABLET ORAL at 23:52

## 2023-10-22 RX ADMIN — ACETAMINOPHEN 975 MG: 325 TABLET ORAL at 18:06

## 2023-10-22 RX ADMIN — OXYCODONE HYDROCHLORIDE 5 MG: 5 TABLET ORAL at 09:30

## 2023-10-22 RX ADMIN — IBUPROFEN 600 MG: 600 TABLET, FILM COATED ORAL at 18:06

## 2023-10-22 RX ADMIN — OXYCODONE HYDROCHLORIDE 10 MG: 5 TABLET ORAL at 23:52

## 2023-10-22 RX ADMIN — ENOXAPARIN SODIUM 40 MG: 40 INJECTION SUBCUTANEOUS at 11:19

## 2023-10-22 RX ADMIN — IBUPROFEN 600 MG: 600 TABLET, FILM COATED ORAL at 11:18

## 2023-10-22 RX ADMIN — OXYCODONE HYDROCHLORIDE 5 MG: 5 TABLET ORAL at 15:50

## 2023-10-22 RX ADMIN — IBUPROFEN 600 MG: 600 TABLET, FILM COATED ORAL at 04:59

## 2023-10-22 ASSESSMENT — PAIN SCALES - GENERAL
PAINLEVEL_OUTOF10: 0 - NO PAIN
PAINLEVEL_OUTOF10: 8
PAINLEVEL_OUTOF10: 9

## 2023-10-22 ASSESSMENT — PAIN DESCRIPTION - DESCRIPTORS: DESCRIPTORS: ACHING;SORE

## 2023-10-22 NOTE — CARE PLAN
Pt with stable vitals and assessment. Pain being controlled with PO pain meds. Pt able to ambulate around room. Incision clean and dry.

## 2023-10-22 NOTE — PROGRESS NOTES
Postpartum Progress Note    Assessment/Plan   Tamia Eisenberg is a 32 y.o., , who delivered at 37w2d gestation and is now postpartum day 1.    s/p LTCS on 10/21  - continue routine postoperative care  - pain well controlled, transition to PO meds   - Hg   11.3--> 9.4 POD#1  - DVT Score: 6, for ppx lovenox    Gestational thrombocytopenia  - plt last 138  - no s&s of bleeding  - continue to monitor    Maternal Well-Being  - emotional support provided  - bonding with infant  - Patient reports not having a car seat yet,  can get supplies needed for baby  - SW consult placed for h/o depression, no symptoms    [place] Feeding  - breastfeeding/pumping encouraged; lactation consult prn     Contraception  - condoms    Dispo  - anticipate d/c on POD #3 if meeting all post-op and postpartum milestones  - for f/u 2wks with Primary OB provider      Principal Problem:    Breech presentation on examination, fetus 1  Active Problems:    Hypothyroidism in pregnancy, antepartum, third trimester    AMA (advanced maternal age) primigravida 35+, third trimester    Benign gestational thrombocytopenia in third trimester (CMS/HCC)    Gastroesophageal reflux disease    Hyperlipidemia    Vision loss    Pregnancy Problems (from 23 to present)       Problem Noted Resolved    Breech presentation on examination, fetus 1 10/20/2023 by Massimo Chowdary MD No    Priority:  Medium      Overview Addendum 10/21/2023 12:04 AM by Emilia Castillo MD     Status post unsuccessful ECV 10/20         Benign gestational thrombocytopenia in third trimester (CMS/HCC) 10/18/2023 by Mary Fam PA-C No    Priority:  Medium      Overview Addendum 10/20/2023 11:16 AM by Kelsie Hanson MD     Plts 148 on 10/18 from 216 in August         36 weeks gestation of pregnancy 10/18/2023 by Mary Fam PA-C No    Priority:  Medium      Hypothyroidism in pregnancy, antepartum, third trimester 10/17/2023 by Tawana Beard MD No     Priority:  Medium      Overview Addendum 10/20/2023 11:15 AM by Kelsie Hanson MD     subclinical hypothyroidism with mild TSH elevation in first trimester, normal T4, now resolved with multiple normal TSH values in third trimester   3/20 TSH 5.15 FT4 0.99   TSH 5.18 FT4 0.9  10/10 TSH 2.32         AMA (advanced maternal age) primigravida 35+, third trimester 10/17/2023 by Tawana Beard MD No    Priority:  Medium      Overview Addendum 10/20/2023  1:45 PM by Massimo Chowdary MD     RR cfDNA               Hospital course: no complications   section delivery  Patient is currently breastfeedingThe patient's blood type is AB POS. The baby's blood type is pending . Rhogam is not indicated.    Subjective   Her pain is well controlled with current medications  She is NOT passing flatus  She is ambulating well  She is tolerating a Adult diet Regular  She reports no breast or nursing problems  She denies emotional concerns today   Her plan for contraception is condoms       Objective   Allergies:   Patient has no known allergies.         Last Vitals:  Temp Pulse Resp BP MAP Pulse Ox   36.4 °C (97.5 °F) 89 16 104/70   97 %     Vitals Min/Max Last 24 Hours:  Temp  Min: 36 °C (96.8 °F)  Max: 37 °C (98.6 °F)  Pulse  Min: 67  Max: 95  Resp  Min: 16  Max: 16  BP  Min: 100/64  Max: 110/69    Intake/Output:     Intake/Output Summary (Last 24 hours) at 10/22/2023 1727  Last data filed at 10/22/2023 0515  Gross per 24 hour   Intake --   Output 1500 ml   Net -1500 ml       Physical Exam:  Incision: healing well, no erythema, well approximated.    Lab Data:  Lab Results   Component Value Date    WBC 12.1 (H) 10/22/2023    HGB 9.4 (L) 10/22/2023    HCT 29.7 (L) 10/22/2023     (L) 10/22/2023

## 2023-10-22 NOTE — LACTATION NOTE
Lactation Consultant Note  Lactation Consultation  Reason for Consult: Follow-up assessment  Consultant Name: Estephanie Jane    Maternal Information  Has mother  before?: No  Infant to breast within first 2 hours of birth?: Yes  Exclusive Pump and Bottle Feed: No    Maternal Assessment  Breast Assessment: Medium, Symmetrical, Compressible  Nipple Assessment: Intact, Erect  Areola Assessment: Normal    Infant Assessment  Infant Behavior: Sleepy, Readiness to feed    Feeding Assessment  Nutrition Source: Breastmilk, Donor human milk  Feeding Method: Nursing at the breast, Paced bottle  Feeding Position: Cross - cradle, Laid back  Suck/Feeding: Sustained, Baby led rhythmically, Tactile stimulation needed  Latch Assessment: Instructed on deep latch, Latch achieved after repeated attempts, Deep latch obtained, Comfortable latch, Correct tongue position, Flanged lips    LATCH TOOL  Latch: Grasps breast, tongue down, lips flanged, rhythmic sucking  Audible Swallowing: Spontaneous and intermittent (24 hours old)  Type of Nipple: Everted (After stimulation)  Comfort (Breast/Nipple): Soft/non-tender  Hold (Positioning): Full assist, staff holds infant at breast  LATCH Score: 8    Breast Pump       Other OB Lactation Tools       Patient Follow-up  Inpatient Lactation Follow-up Needed : Yes  Outpatient Lactation Follow-up: Recommended    Other OB Lactation Documentation  Maternal Risk Factors: Age over 30, primiparity, Hypothyroidism  Infant Risk Factors: Early term birth 37-39 weeks    Recommendations/Summary  Mom states that baby struggled to latch yesterday and overnight, she had him go to the nursery to have bottles of donor breast milk so that she could rest. We discussed that it is important to stimulate her breasts every 2-3 hours whether by latching by pumping to help create a full milk supply. If mom wants to continue to use donor breast milk, then she needs to pump. Mom expressed understanding and said that she  wants to work on latching the baby. Baby was showing hunger cues but was also still sleepy. He was just past 24 hours of life at the time of our visit. I placed baby skin to skin with mom. He was interested in trying to latch but was not opening his mouth wide enough to latch successfully. Mom was able to express some colostrum to entice the latch but baby began tongue sucking at this point. I was able to show mom some jaw massage techniques and how to perform suck training. After a few rounds of suck training and then attempting to quickly switch baby over to latch to the breast, baby latched deeply to the left breast in laid back position. Baby latched with nose and chin touching the breast, flanged lips, and areolar attachment with rhythmic sucks. A couple of swallows noted. Encouraged mom to use breast compression and infant stimulation techniques to keep baby awake and actively feeding at the breast for at least 15-20 minutes. Discussed benefits of skin to skin contact for mom and baby and for mom's milk production and supply. Encouraged mom to continue to call out for assistance with feeds. Mom has a pump for at home and the outpatient lactation information.

## 2023-10-22 NOTE — CARE PLAN
The patient's goals for the shift include Pt will remain safe and free of injury thgrough end of shift 10/22/23 0700.    The clinical goals for the shift include Pt will maintain adequate UOP through end of shift 10/22/23 0700.    Over the shift, the patient did not make progress toward the following goals. Barriers to progression include n/a. Recommendations to address these barriers include n/a.

## 2023-10-23 ENCOUNTER — DOCUMENTATION (OUTPATIENT)
Dept: OBSTETRICS AND GYNECOLOGY | Facility: HOSPITAL | Age: 33
End: 2023-10-23
Payer: COMMERCIAL

## 2023-10-23 LAB
BLOOD EXPIRATION DATE: NORMAL
DISPENSE STATUS: NORMAL
PRODUCT BLOOD TYPE: 6200
PRODUCT CODE: NORMAL
UNIT ABO: NORMAL
UNIT NUMBER: NORMAL
UNIT RH: NORMAL
UNIT VOLUME: 350
XM INTEP: NORMAL

## 2023-10-23 PROCEDURE — 1100000001 HC PRIVATE ROOM DAILY

## 2023-10-23 PROCEDURE — 96372 THER/PROPH/DIAG INJ SC/IM: CPT

## 2023-10-23 PROCEDURE — 2500000001 HC RX 250 WO HCPCS SELF ADMINISTERED DRUGS (ALT 637 FOR MEDICARE OP)

## 2023-10-23 PROCEDURE — 2500000004 HC RX 250 GENERAL PHARMACY W/ HCPCS (ALT 636 FOR OP/ED)

## 2023-10-23 RX ADMIN — SIMETHICONE 80 MG: 80 TABLET, CHEWABLE ORAL at 00:08

## 2023-10-23 RX ADMIN — POLYETHYLENE GLYCOL 3350 17 G: 17 POWDER, FOR SOLUTION ORAL at 13:53

## 2023-10-23 RX ADMIN — OXYCODONE HYDROCHLORIDE 10 MG: 5 TABLET ORAL at 07:18

## 2023-10-23 RX ADMIN — ACETAMINOPHEN 975 MG: 325 TABLET ORAL at 07:19

## 2023-10-23 RX ADMIN — IBUPROFEN 600 MG: 600 TABLET, FILM COATED ORAL at 13:49

## 2023-10-23 RX ADMIN — ACETAMINOPHEN 975 MG: 325 TABLET ORAL at 19:15

## 2023-10-23 RX ADMIN — OXYCODONE HYDROCHLORIDE 10 MG: 5 TABLET ORAL at 13:49

## 2023-10-23 RX ADMIN — ACETAMINOPHEN 975 MG: 325 TABLET ORAL at 13:48

## 2023-10-23 RX ADMIN — OXYCODONE HYDROCHLORIDE 10 MG: 5 TABLET ORAL at 18:09

## 2023-10-23 RX ADMIN — ENOXAPARIN SODIUM 40 MG: 40 INJECTION SUBCUTANEOUS at 13:50

## 2023-10-23 RX ADMIN — IBUPROFEN 600 MG: 600 TABLET, FILM COATED ORAL at 07:19

## 2023-10-23 ASSESSMENT — PAIN SCALES - GENERAL
PAINLEVEL_OUTOF10: 9
PAINLEVEL_OUTOF10: 6
PAINLEVEL_OUTOF10: 6
PAINLEVEL_OUTOF10: 9
PAINLEVEL_OUTOF10: 0 - NO PAIN
PAINLEVEL_OUTOF10: 7

## 2023-10-23 ASSESSMENT — PAIN DESCRIPTION - DESCRIPTORS: DESCRIPTORS: ACHING;SORE

## 2023-10-23 NOTE — PROGRESS NOTES
Postpartum Progress Note    Assessment/Plan   Tamia Eisenberg is a 32 y.o., , who delivered at 37w2d gestation and is now postpartum day 2.    s/p LTCS on 10/21  - continue routine postoperative care  - pain moderately well controlled. Extensive discussion on pain management/expectations  - Hg   11.3--> 9.4 POD#1  - DVT Score: 6, for ppx lovenox    Gestational thrombocytopenia  - plt last 138  - no s&s of bleeding  - continue to monitor    Maternal Well-Being  - emotional support provided  - bonding with infant  - Patient reports not having a car seat yet,  can get supplies needed for baby  - SW consult placed for h/o depression, no symptoms    [facility] Feeding  - breastfeeding/pumping encouraged; lactation consult prn     Contraception  - condoms    Dispo  - anticipate d/c on POD #3 if meeting all post-op and postpartum milestones  - for f/u 2wks with Primary OB provider      Principal Problem:    Breech presentation on examination, fetus 1  Active Problems:    Hypothyroidism in pregnancy, antepartum, third trimester    AMA (advanced maternal age) primigravida 35+, third trimester    Benign gestational thrombocytopenia in third trimester (CMS/HCC)    Gastroesophageal reflux disease    Hyperlipidemia    Vision loss    Pregnancy Problems (from 21 to present)       Problem Noted Resolved    Breech presentation on examination, fetus 1 10/20/2023 by Massimo Chowdary MD No    Priority:  Medium      Overview Addendum 10/21/2023 12:04 AM by Emilia Castillo MD     Status post unsuccessful ECV 10/20         Benign gestational thrombocytopenia in third trimester (CMS/HCC) 10/18/2023 by Mary Fam PA-C No    Priority:  Medium      Overview Addendum 10/20/2023 11:16 AM by Kelsie Hanson MD     Plts 148 on 10/18 from 216 in August         36 weeks gestation of pregnancy 10/18/2023 by Mary Fam PA-C No    Priority:  Medium      Hypothyroidism in pregnancy, antepartum, third trimester  10/17/2023 by Tawana Beard MD No    Priority:  Medium      Overview Addendum 10/20/2023 11:15 AM by Kelsie Hanson MD     subclinical hypothyroidism with mild TSH elevation in first trimester, normal T4, now resolved with multiple normal TSH values in third trimester   3/20 TSH 5.15 FT4 0.99   TSH 5.18 FT4 0.9  10/10 TSH 2.32         AMA (advanced maternal age) primigravida 35+, third trimester 10/17/2023 by Tawana Beard MD No    Priority:  Medium      Overview Addendum 10/20/2023  1:45 PM by Massimo Chowdary MD     RR cfDNA               Hospital course: no complications   section delivery  Patient is currently breastfeedingThe patient's blood type is AB POS. The baby's blood type is pending . Rhogam is not indicated.    Subjective   Her pain is moderately well controlled with current medications  She is passing flatus  She is ambulating well  She is tolerating a Adult diet Regular  She reports no breast or nursing problems  She denies emotional concerns today   Her plan for contraception is condoms    Endorses pain is severe enough to keep her in bed. She cannot move without being in pain. Goal pain score 4-5/10. Is taking motrin/tylenol and prn oxy. Tried binder and it didn't help. Has not tried timed voiding.     Objective   Allergies:   Patient has no known allergies.         Last Vitals:  Temp Pulse Resp BP MAP Pulse Ox   37.1 °C (98.8 °F) 86 16 100/61   97 %     Vitals Min/Max Last 24 Hours:  Temp  Min: 36.3 °C (97.3 °F)  Max: 37.1 °C (98.8 °F)  Pulse  Min: 73  Max: 91  Resp  Min: 16  Max: 18  BP  Min: 95/58  Max: 118/76    Intake/Output:   No intake or output data in the 24 hours ending 10/23/23 7173      Physical Exam:  General: well appearing, well-nourished, postpartum  Obstetric: abdomen soft, non-tender, fundus firm below umbilicus, lochia light  Skin: Warm, dry; no rashes/lesions/erythema; Pfannenstiel incision: clean, dry, well approximated, open to air, negative  discharge/warmth/erythema   Breast: No masses, nipple discharge  Neuro: A/Ox3, conversational  GI: +BS, +flatus  Respiratory: Even and unlabored on RA, LSCTA BL  Cardiovascular: 1+ BLE edema; No erythema, warmth, or paraesthesia  Psych: appropriate mood and affect      Lab Data:  Lab Results   Component Value Date    WBC 12.1 (H) 10/22/2023    HGB 9.4 (L) 10/22/2023    HCT 29.7 (L) 10/22/2023     (L) 10/22/2023

## 2023-10-23 NOTE — LACTATION NOTE
Lactation Consultant Note  Lactation Consultation       Maternal Information       Maternal Assessment       Infant Assessment       Feeding Assessment       LATCH TOOL       Breast Pump       Other OB Lactation Tools       Patient Follow-up       Other OB Lactation Documentation       Recommendations/Summary  Mother reports feeding is going well and that she does not need assistance at this time. She reports that she will call for assistance as desired.

## 2023-10-24 ENCOUNTER — PHARMACY VISIT (OUTPATIENT)
Dept: PHARMACY | Facility: CLINIC | Age: 33
End: 2023-10-24
Payer: COMMERCIAL

## 2023-10-24 PROCEDURE — 96372 THER/PROPH/DIAG INJ SC/IM: CPT

## 2023-10-24 PROCEDURE — 1100000001 HC PRIVATE ROOM DAILY

## 2023-10-24 PROCEDURE — RXMED WILLOW AMBULATORY MEDICATION CHARGE

## 2023-10-24 PROCEDURE — 2500000004 HC RX 250 GENERAL PHARMACY W/ HCPCS (ALT 636 FOR OP/ED)

## 2023-10-24 PROCEDURE — 2500000005 HC RX 250 GENERAL PHARMACY W/O HCPCS

## 2023-10-24 PROCEDURE — 2500000001 HC RX 250 WO HCPCS SELF ADMINISTERED DRUGS (ALT 637 FOR MEDICARE OP)

## 2023-10-24 RX ORDER — ACETAMINOPHEN 500 MG
1000 TABLET ORAL EVERY 6 HOURS PRN
Qty: 120 TABLET | Refills: 0 | Status: SHIPPED | OUTPATIENT
Start: 2023-10-24

## 2023-10-24 RX ORDER — FERROUS SULFATE 325(65) MG
65 TABLET ORAL
Qty: 30 TABLET | Refills: 1 | Status: SHIPPED | OUTPATIENT
Start: 2023-10-24 | End: 2024-01-13

## 2023-10-24 RX ORDER — IBUPROFEN 600 MG/1
600 TABLET ORAL EVERY 6 HOURS PRN
Qty: 120 TABLET | Refills: 0 | Status: SHIPPED | OUTPATIENT
Start: 2023-10-24 | End: 2023-11-23

## 2023-10-24 RX ORDER — OXYCODONE HYDROCHLORIDE 5 MG/1
5 TABLET ORAL EVERY 6 HOURS PRN
Qty: 20 TABLET | Refills: 0 | Status: SHIPPED | OUTPATIENT
Start: 2023-10-24 | End: 2023-10-29

## 2023-10-24 RX ORDER — POLYETHYLENE GLYCOL 3350 17 G/17G
17 POWDER, FOR SOLUTION ORAL DAILY PRN
Qty: 238 G | Refills: 0 | Status: SHIPPED | OUTPATIENT
Start: 2023-10-24 | End: 2023-11-23

## 2023-10-24 RX ORDER — NALOXONE HYDROCHLORIDE 4 MG/.1ML
4 SPRAY NASAL AS NEEDED
Qty: 2 EACH | Refills: 1 | Status: SHIPPED | OUTPATIENT
Start: 2023-10-24 | End: 2023-11-23

## 2023-10-24 RX ORDER — CYCLOBENZAPRINE HCL 10 MG
10 TABLET ORAL 3 TIMES DAILY PRN
Qty: 15 TABLET | Refills: 0 | Status: SHIPPED | OUTPATIENT
Start: 2023-10-24 | End: 2023-10-29

## 2023-10-24 RX ADMIN — ACETAMINOPHEN 975 MG: 325 TABLET ORAL at 12:22

## 2023-10-24 RX ADMIN — IBUPROFEN 600 MG: 600 TABLET, FILM COATED ORAL at 07:28

## 2023-10-24 RX ADMIN — IBUPROFEN 600 MG: 600 TABLET, FILM COATED ORAL at 17:49

## 2023-10-24 RX ADMIN — ACETAMINOPHEN 975 MG: 325 TABLET ORAL at 01:22

## 2023-10-24 RX ADMIN — ACETAMINOPHEN 975 MG: 325 TABLET ORAL at 07:27

## 2023-10-24 RX ADMIN — IBUPROFEN 600 MG: 600 TABLET, FILM COATED ORAL at 12:24

## 2023-10-24 RX ADMIN — LIDOCAINE 1 PATCH: 4 PATCH TOPICAL at 03:01

## 2023-10-24 RX ADMIN — IBUPROFEN 600 MG: 600 TABLET, FILM COATED ORAL at 01:23

## 2023-10-24 RX ADMIN — ENOXAPARIN SODIUM 40 MG: 40 INJECTION SUBCUTANEOUS at 17:50

## 2023-10-24 RX ADMIN — OXYCODONE HYDROCHLORIDE 5 MG: 5 TABLET ORAL at 02:59

## 2023-10-24 ASSESSMENT — PAIN SCALES - GENERAL
PAINLEVEL_OUTOF10: 6
PAINLEVEL_OUTOF10: 3
PAINLEVEL_OUTOF10: 7
PAINLEVEL_OUTOF10: 5 - MODERATE PAIN
PAINLEVEL_OUTOF10: 7
PAINLEVEL_OUTOF10: 6
PAINLEVEL_OUTOF10: 7
PAINLEVEL_OUTOF10: 3
PAINLEVEL_OUTOF10: 6
PAINLEVEL_OUTOF10: 0 - NO PAIN

## 2023-10-24 NOTE — PROGRESS NOTES
Postpartum Progress Note    Assessment/Plan   Tamia Eisenberg is a 32 y.o., , who delivered at 37w2d gestation and is now postpartum day 3.    s/p LTCS on 10/21  - continue routine postoperative care  - pain moderately well controlled.   - Hg   11.3--> 9.4 POD#1  - DVT Score: 6, for ppx lovenox    Gestational thrombocytopenia  - plt last 138  - no s&s of bleeding  - continue to monitor    Maternal Well-Being  - emotional support provided  - SW consult placed for h/o depression, no symptoms  - requesting additional overnight stay    [place] Feeding  - breastfeeding/pumping encouraged; lactation consult prn     Contraception  - condoms    Dispo  - anticipate d/c on POD #4 if meeting all post-op and postpartum milestones  - for f/u 2wks with Primary OB provider      Principal Problem:    Breech presentation on examination, fetus 1  Active Problems:    Hypothyroidism in pregnancy, antepartum, third trimester    AMA (advanced maternal age) primigravida 35+, third trimester    Benign gestational thrombocytopenia in third trimester (CMS/HCC)    Gastroesophageal reflux disease    Hyperlipidemia    Vision loss    Pregnancy Problems (from 23 to present)       Problem Noted Resolved    Breech presentation on examination, fetus 1 10/20/2023 by Massimo Chowdary MD No    Priority:  Medium      Overview Addendum 10/21/2023 12:04 AM by Emilia Castillo MD     Status post unsuccessful ECV 10/20         Benign gestational thrombocytopenia in third trimester (CMS/HCC) 10/18/2023 by Mary Fam PA-C No    Priority:  Medium      Overview Addendum 10/20/2023 11:16 AM by Kelsie Hanson MD     Plts 148 on 10/18 from 216 in August         36 weeks gestation of pregnancy 10/18/2023 by Mary Fam PA-C No    Priority:  Medium      Hypothyroidism in pregnancy, antepartum, third trimester 10/17/2023 by Tawana Beard MD No    Priority:  Medium      Overview Addendum 10/20/2023 11:15 AM by Kelsie SOTO  "MD Margie     subclinical hypothyroidism with mild TSH elevation in first trimester, normal T4, now resolved with multiple normal TSH values in third trimester   3/20 TSH 5.15 FT4 0.99   TSH 5.18 FT4 0.9  10/10 TSH 2.32         AMA (advanced maternal age) primigravida 35+, third trimester 10/17/2023 by Tawana Beard MD No    Priority:  Medium      Overview Addendum 10/20/2023  1:45 PM by Massimo Chowdary MD     RR cfDNA               Hospital course: no complications   section delivery  Patient is currently breastfeedingThe patient's blood type is AB POS. Rhogam is not indicated.    Subjective   Her pain is moderately well controlled with current medications  She is passing flatus  She is ambulating with assistance- heel pain is causing her to favor one side.  She is tolerating a Adult diet Regular  She reports no breast or nursing problems  She  reports concerns about recovering at home  today.   Her plan for contraception is condoms    Endorses concerns about heel pain- when she flexes her foot or ambulates, she gets an intermittent \"electrical\" shock in her heel that goes about CHCF up the calf. This is significant enough to impair her balance/ability to walk independently. Her partner is by her side for assistance. She also leans on the wall or furniture for support.     Objective   Allergies:   Patient has no known allergies.         Last Vitals:  Temp Pulse Resp BP MAP Pulse Ox   37.7 °C (99.9 °F) 78 16 112/71   97 %     Vitals Min/Max Last 24 Hours:  Temp  Min: 36.8 °C (98.2 °F)  Max: 37.7 °C (99.9 °F)  Pulse  Min: 78  Max: 79  Resp  Min: 16  Max: 17  BP  Min: 106/68  Max: 112/71    Intake/Output:   No intake or output data in the 24 hours ending 10/24/23 1449      Physical Exam:  General: well appearing, well-nourished, postpartum  Obstetric: abdomen soft, non-tender, fundus firm below umbilicus, lochia light  Skin: Warm, dry; no rashes/lesions/erythema; Pfannenstiel incision: " clean, dry, well approximated, open to air, negative discharge/warmth/erythema   Breast: No masses, nipple discharge  Neuro: A/Ox3, conversational  GI: +BS, +flatus  Respiratory: Even and unlabored on RA, LSCTA BL  Cardiovascular: 1+ BLE edema; No erythema, warmth   Psych: appropriate mood and affect    Lab Data:  Lab Results   Component Value Date    WBC 12.1 (H) 10/22/2023    HGB 9.4 (L) 10/22/2023    HCT 29.7 (L) 10/22/2023     (L) 10/22/2023

## 2023-10-24 NOTE — CARE PLAN
The patient's goals for the shift include pt to report pain stable with po medication    The clinical goals for the shift include pt to report pain stable with po medication

## 2023-10-24 NOTE — LACTATION NOTE
Lactation Consultant Note  Lactation Consultation  Reason for Consult: Follow-up assessment  Consultant Name: Shannan Miguel RN IBCLC    Maternal Information  Has mother  before?: No    Maternal Assessment  Breast Assessment: Full, Medium, Symmetrical, Compressible  Nipple Assessment: Intact, Erect, Large diameter  Areola Assessment: Normal    Infant Assessment  Infant Behavior: Sleepy, Content after feeding, Suckles on and off, needs stimulation, Other (Comment) (infant required much stimulatin with feed to keep interested)  Infant Assessment: Jaundice, Other (Comment) (infant reluctant to suck on finger for an assessment)    Feeding Assessment  Nutrition Source: Breastmilk, Donor human milk  Feeding Method: Nursing at the breast, Feeding expressed breastmilk  Feeding Position: Cross - cradle, Skin to skin, Mother needs assistance with latch/positioning  Suck/Feeding: Sustained, Tactile stimulation needed, Coordinated suck/swallow/breathe, Audible swallowing with stimulaton  Latch Assessment: Minimal assistance is needed, Instructed on deep latch, Eagerly grasped on to latch, Deep latch obtained, Optimal angle of mouth opening, Comfortable with no pain, Sucking and swallowing, Sucks with long jaw movement, Bursts of sucking, swallowing, and rest, Flanged lips, Chin moves in rhythmic motion    LATCH TOOL  Latch: Repeated attempts, hold nipple in mouth, stimulate to suck  Audible Swallowing: Spontaneous and intermittent (24 hours old)  Type of Nipple: Everted (After stimulation)  Comfort (Breast/Nipple): Soft/non-tender  Hold (Positioning): Minimal assist, teach one side, mother does other, staff holds  LATCH Score: 8    Breast Pump       Other OB Lactation Tools       Patient Follow-up  Inpatient Lactation Follow-up Needed : Yes    Other OB Lactation Documentation  Maternal Risk Factors: Age over 30, primiparity, Hypothyroidism,  delivery  Infant Risk Factors: Early term birth 37-39  weeks    Recommendations/Summary  Mother with warm compresses on her breast upon my arrival to room. She stated that her breast are full and sore and she was wanting to pump to help drain her breast. Mother reported that infant had nursed recently. Questioned mother if we could attempt another feeding for a feeding assessment. Mother agreeable. Positioned infant to latch using a cross cradle hold. Infant latched eagerly. Assisted minimally with latching infant deeply to her breast. Infant latched well but did require much stimulation to keep continuing to nurse. Reviewed with mother ways to stimulate infant during feedings if infant becomes sleepy and sluggish when nursing. Infant had several bursts of rhythmic sucking with audible swallows appreciated. Educated mother on the importance of placing infant to the breast well and keeping infant stimulated during feeds to assist with breast drainage. Infant born at 37.2 weeks gestation but appears to feed more like a late  infant. Educated mother on the need to offer infant feedings frequently every 2-3 hours awakening for feedings if needed. Discussed with mother breast fulness and that her breasts are filling. Will provide mother with the PI sheet on breast fulness. Encouraged to call for further feeding assistance if needed. Discussed the availability of the outpatient lactation centers for assistance if needed upon their discharge.

## 2023-10-25 VITALS
DIASTOLIC BLOOD PRESSURE: 69 MMHG | WEIGHT: 161.6 LBS | TEMPERATURE: 98.2 F | HEIGHT: 63 IN | RESPIRATION RATE: 16 BRPM | SYSTOLIC BLOOD PRESSURE: 112 MMHG | HEART RATE: 70 BPM | BODY MASS INDEX: 28.63 KG/M2 | OXYGEN SATURATION: 96 %

## 2023-10-25 PROBLEM — O09.513 AMA (ADVANCED MATERNAL AGE) PRIMIGRAVIDA 35+, THIRD TRIMESTER (HHS-HCC): Status: RESOLVED | Noted: 2023-10-17 | Resolved: 2023-10-25

## 2023-10-25 PROBLEM — O99.113 BENIGN GESTATIONAL THROMBOCYTOPENIA IN THIRD TRIMESTER (MULTI): Status: RESOLVED | Noted: 2023-10-18 | Resolved: 2023-10-25

## 2023-10-25 PROBLEM — O99.283: Status: RESOLVED | Noted: 2023-10-17 | Resolved: 2023-10-25

## 2023-10-25 PROBLEM — D69.6 BENIGN GESTATIONAL THROMBOCYTOPENIA IN THIRD TRIMESTER (MULTI): Status: RESOLVED | Noted: 2023-10-18 | Resolved: 2023-10-25

## 2023-10-25 PROBLEM — H54.7 VISION LOSS: Status: RESOLVED | Noted: 2023-10-20 | Resolved: 2023-10-25

## 2023-10-25 PROBLEM — D62 POSTOPERATIVE ANEMIA DUE TO ACUTE BLOOD LOSS: Status: ACTIVE | Noted: 2023-10-25

## 2023-10-25 PROBLEM — E03.9: Status: RESOLVED | Noted: 2023-10-17 | Resolved: 2023-10-25

## 2023-10-25 LAB
LABORATORY COMMENT REPORT: NORMAL
PATH REPORT.FINAL DX SPEC: NORMAL
PATH REPORT.GROSS SPEC: NORMAL
PATH REPORT.RELEVANT HX SPEC: NORMAL
PATH REPORT.TOTAL CANCER: NORMAL

## 2023-10-25 PROCEDURE — 2500000001 HC RX 250 WO HCPCS SELF ADMINISTERED DRUGS (ALT 637 FOR MEDICARE OP)

## 2023-10-25 RX ADMIN — OXYCODONE HYDROCHLORIDE 5 MG: 5 TABLET ORAL at 13:07

## 2023-10-25 RX ADMIN — ACETAMINOPHEN 975 MG: 325 TABLET ORAL at 07:17

## 2023-10-25 RX ADMIN — IBUPROFEN 600 MG: 600 TABLET, FILM COATED ORAL at 01:20

## 2023-10-25 RX ADMIN — ACETAMINOPHEN 975 MG: 325 TABLET ORAL at 01:20

## 2023-10-25 RX ADMIN — OXYCODONE HYDROCHLORIDE 5 MG: 5 TABLET ORAL at 01:20

## 2023-10-25 RX ADMIN — ACETAMINOPHEN 975 MG: 325 TABLET ORAL at 13:06

## 2023-10-25 RX ADMIN — IBUPROFEN 600 MG: 600 TABLET, FILM COATED ORAL at 13:07

## 2023-10-25 RX ADMIN — OXYCODONE HYDROCHLORIDE 5 MG: 5 TABLET ORAL at 07:17

## 2023-10-25 RX ADMIN — IBUPROFEN 600 MG: 600 TABLET, FILM COATED ORAL at 07:18

## 2023-10-25 ASSESSMENT — PAIN SCALES - GENERAL
PAINLEVEL_OUTOF10: 6
PAINLEVEL_OUTOF10: 7

## 2023-10-25 ASSESSMENT — PAIN SCALES - PAIN ASSESSMENT IN ADVANCED DEMENTIA (PAINAD)
FACIALEXPRESSION: SMILING OR INEXPRESSIVE
CONSOLABILITY: NO NEED TO CONSOLE
BREATHING: NORMAL
BODYLANGUAGE: RELAXED
CONSOLABILITY: NO NEED TO CONSOLE
BODYLANGUAGE: RELAXED
FACIALEXPRESSION: SMILING OR INEXPRESSIVE
TOTALSCORE: 0
TOTALSCORE: 0
BREATHING: NORMAL

## 2023-10-25 ASSESSMENT — PAIN SCALES - WONG BAKER
WONGBAKER_NUMERICALRESPONSE: NO HURT
WONGBAKER_NUMERICALRESPONSE: NO HURT

## 2023-10-25 NOTE — DISCHARGE SUMMARY
Discharge Summary    Admission Date: 10/20/2023  Discharge Date: 10/25/23  Discharge Diagnosis: Breech presentation on examination, fetus 1     Patient Active Problem List   Diagnosis    Vitamin D deficiency    36 weeks gestation of pregnancy    Gastroesophageal reflux disease    Hyperlipidemia     delivery delivered    Postoperative anemia due to acute blood loss       Hospital Course  Admission Date: 10/20/2023    Delivery Date: 10/21/2023  6:24 AM     Delivery type: , Low Transverse      GA at delivery: 37w2d    Outcome: Living     Anesthesia during delivery: Combined spinal-epidural     Intrapartum complications: Abruptio Placenta     Feeding method: Breastfeeding Status: Yes    Contraception: Condoms    The patient's blood type is AB POS. Rhogam is not indicated.     Tamia Eisenberg is a 32 y.o., , who was admitted on 10/20/2023 and had a , Low Transverse  delivery on 10/21/2023  at 37w2d and is now postpartum day 4. PP course uneventful.  Meeting all postpartum milestones- ambulating independently, passing flatus, tolerating PO intake, lochia light, voiding spontaneously, and pain well controlled with PO meds. She denies CP, SOB, calf pain, fever, passage of large clots. acute blood loss anemia, asymptomatic, PO iron supplement on DC     Pertinent Physical Exam At Time of Discharge  General: well appearing, well-nourished, postpartum  Obstetric: fundus firm below umbilicus, lochia light  Skin: Warm, dry; no rashes/lesions/erythema; Pfannenstiel incision: clean, dry, well approximated, open to air, negative discharge/warmth/erythema   Breast: No masses, nipple discharge  Neuro: A/Ox3, conversational  GI: +BS, abdomen soft, non-tender,   Respiratory: Even and unlabored on RA, LSCTA BL  Cardiovascular: Trace BLE edema; No erythema, warmth  Psych: appropriate mood and affect       Your medication list        START taking these medications        Instructions Last Dose Given Next Dose  Due   acetaminophen 500 mg tablet  Commonly known as: Tylenol      Take 2 tablets (1,000 mg) by mouth every 6 hours if needed for moderate pain (4 - 6).       FeroSuL 325 (65 Fe) MG tablet  Generic drug: ferrous sulfate      Take 1 tablet (65 mg of iron) by mouth once daily with a meal.        mg tablet  Generic drug: ibuprofen      Take 1 tablet (600 mg) by mouth every 6 hours if needed for moderate pain (4 - 6) (pain).       Narcan 4 mg/0.1 mL nasal spray  Generic drug: naloxone      Administer 1 spray (4 mg) into affected nostril(s) if needed for opioid reversal or respiratory depression for up to 1 day. May repeat every 2-3 minutes if needed, alternating nostrils, until medical assistance becomes available.       oxyCODONE 5 mg immediate release tablet  Commonly known as: Roxicodone      Take 1 tablet (5 mg) by mouth every 6 hours if needed for severe pain (7 - 10) for up to 5 days.       polyethylene glycol 17 gram/dose powder  Commonly known as: Glycolax, Miralax      Take 17 g (mix 1 capful) by mouth once daily as needed (constipation).              CONTINUE taking these medications        Instructions Last Dose Given Next Dose Due   cyclobenzaprine 10 mg tablet  Commonly known as: Flexeril      Take 1 tablet (10 mg) by mouth 3 times a day as needed for muscle spasms for up to 5 days.       ergocalciferol 1.25 MG (86332 UT) capsule  Commonly known as: Vitamin D-2      Take 1 capsule (1,250 mcg) by mouth 1 (one) time per week.                 Where to Get Your Medications        These medications were sent to [facility]  [facility] [place] [address]      Hours: 8:30 AM to 5 PM Mon-Fri Phone: [phone]   acetaminophen 500 mg tablet  cyclobenzaprine 10 mg tablet  FeroSuL 325 (65 Fe) MG tablet   mg tablet  Narcan 4 mg/0.1 mL nasal spray  oxyCODONE 5 mg immediate release tablet  polyethylene glycol 17 gram/dose powder           Complications Requiring Follow-Up  2 week  incision check and 6wk postpartum follow-up.   Outpatient Follow-Up  Future Appointments   Date Time Provider Department Center   10/31/2023  2:00 PM Rosemarie Sam MD [id number] [facility]   11/7/2023  3:45 PM Rosemarie Sam MD [id number]     Darby Thomas, APRN-CNP

## 2023-10-25 NOTE — PROGRESS NOTES
Tamia Eisenberg is a 32 y.o., , who had a , Low Transverse  delivery on 10/21/2023  at 37w2d and is now POD4.    She had CSE without immediate complications noted.       Pain was appropriately tolerated by patient.     Vitals:    10/25/23 0716   BP: 112/69   Pulse: 70   Resp: 16   Temp: 36.8 °C (98.2 °F)   SpO2: 96%       Neuraxial site assessed. No visible redness or swelling. Pain acceptably controlled. Patient able to ambulate and move all extremities without difficulty. Able to void. No complaints of nausea/vomiting. Tolerating PO intake well. No s/sx of PDPH.     Neuraxial site without redness, drainage, swelling.  Neuromuscular block resolved.    Anesthesia will sign off     Ahsan Rivera, CAA

## 2023-10-25 NOTE — DISCHARGE INSTR - APPOINTMENTS
Assessment/Intervention/Current Symtoms and Care Coordination: Writer met with pt on this date and consulted with psychiatrist. Pt was observed up and out of her room this morning. Pt also attended community group and OT group this morning. Pt is brighter in affect, stating that he feels that his symptoms have improved. Pt states that he experienced several acute stressors in a short period of time that led to his suicide attempt. Pt states that he is better able to cope with stress and reports that the medications are helpful. Writer discussed Counts include 234 beds at the [facility] resources and information for housing support. Pt is aware of psychiatry appointment that has been set for April 13th and May 13th. Writer and pt discussed completing intake paperwork prior to initial appointment. Pt is also aware that these are virtual appointments. Pt denies SI/HI/SIB.     Writer spoke to psychiatrist who is in agreement with pt discharging home tomorrow. Pt is in agreement with this plan. Pt will be need to be cabbed to mother's home. Pt also expressed concern that he does not have any clothing to wear home. Writer notified pt's nurse in this regard.     Writer did call [facility] Intake and scheduled follow-up with pt's individual therapist- KATHRYN Mcdonald for 4/04/22 @ 3:30PM.     Discharge Plan or Goal: Psychiatrically stabilize and discharge home.       Barriers to Discharge: care-coordination, medication-management, symptom stabilization.      Referral Status: TBD- Pt is currently established with individual therapy with KATHRYN Mcdonald @ [facility]. Pt states that he is agreeable to a psychiatry referral.      Appointment Type: Outpatient psychiatry appointment   Provider: RAMA Owens   Date & Time: Wednesday, April 13th, 2022 @ 7:05 AM   Clinic: [facility]   Address: [address] [place] [address], [address] [place] [id number]  Phone: [phone]  Additional Notes: This is a  Call OB provider to schedule 4-6 week postpartum visit.   75 minute appointment. Intake paperwork has been emailed to you. Please complete and submit prior to first appointment. A link will be texted to you for virtual appointment. Please verify that this provider is in-network with your insurance company.      Appointment Type: Outpatient psychiatry appointment follow-up   Provider: RAMA Owens   Date & Time: Friday, May 13th, 2022 @ 10:50 AM   Clinic: [facility]   Address: [address] [place] [address]  Phone: [phone]  Additional Notes: This is a virtual appointment. This is a 25 minute follow-up appointment.      Legal Status: Voluntary       LAURA Perkins [facility], 3/29/2022, 10:35 AM

## 2023-10-25 NOTE — LACTATION NOTE
Lactation Consultant Note  Lactation Consultation  Reason for Consult: Follow-up assessment, Infant weight loss    Maternal Information  Has mother  before?: No  Infant to breast within first 2 hours of birth?: Yes  Exclusive Pump and Bottle Feed: No    Maternal Assessment  Breast Assessment: Medium, Large, Full, Firm  Nipple Assessment: Intact, Erect  Areola Assessment: Normal    Infant Assessment  Infant Behavior: Awake, Active alert  Infant Assessment: Good cupping of tongue, Jaundice    Feeding Assessment  Nutrition Source: Breastmilk  Feeding Method: Nursing at the breast, Paced bottle, Feeding expressed breastmilk  Feeding Position: Skin to skin, Nipple to nose, Cross - cradle, Football/seated, Mother demonstrates good positioning, Nose lightly touching breast, Chin tucked into breast  Suck/Feeding: Sustained, Audible swallowing, Coordinated suck/swallow/breathe, Content after feeding  Latch Assessment: Eagerly grasped on to latch, Areolar attachment, Deep latch obtained, Comfortable with no pain, Sucking and swallowing, Bursts of sucking, swallowing, and rest, Flanged lips, Comfortable latch, Correct tongue position, Frequent audible swallows    LATCH TOOL  Latch: Grasps breast, tongue down, lips flanged, rhythmic sucking  Audible Swallowing: Spontaneous and intermittent (24 hours old)  Type of Nipple: Everted (After stimulation)  Comfort (Breast/Nipple): Soft/non-tender  Hold (Positioning): No assist from staff, mother able to position/hold infant  LATCH Score: 10    Breast Pump  Pump: Hospital grade electric pump  Frequency: Less than 3 times per day  Duration: 15-20 minutes per session  Breast Shield Size and Type: 24 mm  Volume of Milk Production: 15  Units of Volume: mL per session    Other OB Lactation Tools       Patient Follow-up  Inpatient Lactation Follow-up Needed : No  Outpatient Lactation Follow-up: Recommended  Lactation Professional - OK to Discharge: Yes    Other OB Lactation  Documentation  Maternal Risk Factors: Age over 30, primiparity (first time mom, needs more confidence)    Recommendations/Summary  Based on baby's weight being down near 10%, I recommend mom breastfeed baby for at least 30mins every 2-3 hours or on demand. I also recommend mom to pump after a few feeds during the day, in case she looses confidence over night and wants to give baby more milk via bottle. I do not believe that supplementation with donor milk or formula is necessary at this time, as long as mom continues feeding on demand and offering baby her expressed milk in addition.  Mom has a pump for home as well as outpatient information for follow up as needed.

## 2023-10-26 NOTE — SIGNIFICANT EVENT
Follow-up Phone Call Note:   Interview:  Care Type: Women's Health   Phone Number Call  [phone]   Call Outcome: Connected with patient   Patient Reports Feeling (symptoms) Are: much worse   Which Meds Were New Meds:  Yes   Which Meds to Continue:  Yes   Which Meds to Stop: no medications were discontinued   Who participated in medication reconciliation with the hospital staff?: you   In your professional opinion do you think there was a medication discrepancy or potential for medication discrepancy in this situation?: no   Medication Issues   Discharge Instructions Clear:   Yes   Patient Has a Primary Care Provider:     Yes   Post-hospitalization Follow-up Occurred According To Schedule:    No   Reason: appointment not scheduled, encouraged patient to call for an appointment    Delivered Baby(ies): Yes   Chest Pain:  No   Shortness of breath or difficulty breathing:  No   Seizures:  No   Any thoughts of hurting yourself or your baby:   No   Bleeding that is soaking through one pad/hour or blood clots the size of an egg or bigger:  No   Incision that is not healing:  No   Red or swollen leg that is painful or warm to the touch:  No   Temperature of 100.4F or higher:  No   Headache that does not get better, even after taking medicine, or a bad headache with vision changes:  No   Where or in what is your baby sleeping?: [place]   ABC's of sleep covered:  Yes   How Are You Feeding Your Baby(ies): breast   Baby Getting Anything Other Than Breastmilk Or Formula For Food:  No   Patient Has Primary Care Provider For Baby(ies):  Yes   Baby Has Been Seen By a Health Care Provider Since Discharge:  Yes   Which Health Care Provider Saw the Baby: physician office/clinic visit   Mom's Discharge Date: 10/25/23   Date the Baby Was Seen By a Health Care Provider After Discharge: 10/26/23   Patient Has Plan Specific Name And Number Of Who To Call For Concerns:  Yes   Stroke Patient:  No  Comments:    Date/Time Of Call: 10/26/23  at 1441   Call Back Done By: care coordinator   Callback Complete:  Yes

## 2023-11-07 ENCOUNTER — APPOINTMENT (OUTPATIENT)
Dept: OBSTETRICS AND GYNECOLOGY | Facility: HOSPITAL | Age: 33
End: 2023-11-07
Payer: COMMERCIAL

## 2023-11-21 DIAGNOSIS — E55.9 VITAMIN D DEFICIENCY: ICD-10-CM

## 2023-11-21 RX ORDER — ERGOCALCIFEROL 1.25 MG/1
1 CAPSULE ORAL
Qty: 12 CAPSULE | Refills: 1 | Status: SHIPPED | OUTPATIENT
Start: 2023-11-21 | End: 2023-12-13 | Stop reason: ALTCHOICE

## 2023-11-29 ENCOUNTER — OFFICE VISIT (OUTPATIENT)
Dept: OPHTHALMOLOGY | Facility: CLINIC | Age: 33
End: 2023-11-29
Payer: COMMERCIAL

## 2023-11-29 DIAGNOSIS — H04.123 DRY EYE SYNDROME OF BOTH EYES: ICD-10-CM

## 2023-11-29 DIAGNOSIS — H52.13 MYOPIA OF BOTH EYES: Primary | ICD-10-CM

## 2023-11-29 PROCEDURE — 92015 DETERMINE REFRACTIVE STATE: CPT | Performed by: STUDENT IN AN ORGANIZED HEALTH CARE EDUCATION/TRAINING PROGRAM

## 2023-11-29 PROCEDURE — 92004 COMPRE OPH EXAM NEW PT 1/>: CPT | Performed by: STUDENT IN AN ORGANIZED HEALTH CARE EDUCATION/TRAINING PROGRAM

## 2023-11-29 ASSESSMENT — ENCOUNTER SYMPTOMS
MUSCULOSKELETAL NEGATIVE: 0
GASTROINTESTINAL NEGATIVE: 0
ENDOCRINE NEGATIVE: 0
HEMATOLOGIC/LYMPHATIC NEGATIVE: 0
CARDIOVASCULAR NEGATIVE: 0
ALLERGIC/IMMUNOLOGIC NEGATIVE: 0
EYES NEGATIVE: 0
NEUROLOGICAL NEGATIVE: 0
CONSTITUTIONAL NEGATIVE: 0
RESPIRATORY NEGATIVE: 0
PSYCHIATRIC NEGATIVE: 0

## 2023-11-29 ASSESSMENT — CONF VISUAL FIELD
OS_INFERIOR_TEMPORAL_RESTRICTION: 0
OS_INFERIOR_NASAL_RESTRICTION: 0
OS_SUPERIOR_NASAL_RESTRICTION: 0
OS_SUPERIOR_TEMPORAL_RESTRICTION: 0
OS_NORMAL: 1
OD_SUPERIOR_NASAL_RESTRICTION: 0
OD_NORMAL: 1
OD_INFERIOR_NASAL_RESTRICTION: 0
METHOD: COUNTING FINGERS
OD_INFERIOR_TEMPORAL_RESTRICTION: 0
OD_SUPERIOR_TEMPORAL_RESTRICTION: 0

## 2023-11-29 ASSESSMENT — EXTERNAL EXAM - RIGHT EYE: OD_EXAM: NORMAL

## 2023-11-29 ASSESSMENT — TONOMETRY
OS_IOP_MMHG: 12
IOP_METHOD: GOLDMANN APPLANATION
OD_IOP_MMHG: 12

## 2023-11-29 ASSESSMENT — REFRACTION_MANIFEST
OD_AXIS: 020
OS_CYLINDER: -1.00
OS_SPHERE: -4.00
OD_SPHERE: -2.50
OS_AXIS: 168
OD_CYLINDER: -1.25

## 2023-11-29 ASSESSMENT — REFRACTION_WEARINGRX
OD_SPHERE: -3.00
OD_CYLINDER: -0.50
OS_CYLINDER: -0.50
OS_SPHERE: -3.75
OD_AXIS: 115
OS_AXIS: 050

## 2023-11-29 ASSESSMENT — CUP TO DISC RATIO
OS_RATIO: .25
OD_RATIO: .25

## 2023-11-29 ASSESSMENT — VISUAL ACUITY
OS_CC: 20/30
METHOD: SNELLEN - LINEAR
OD_PH_CC: NO IMPROVEMENT
OD_CC: 20/30+2
OS_PH_CC: NO IMPROVEMENT
CORRECTION_TYPE: GLASSES

## 2023-11-29 ASSESSMENT — EXTERNAL EXAM - LEFT EYE: OS_EXAM: NORMAL

## 2023-11-29 NOTE — PROGRESS NOTES
Assessment/Plan   Diagnoses and all orders for this visit:  Myopia of both eyes  -New spec rx released today per patient request. Ocular health wnl for age OU. Monitor 1 year or sooner prn. Refraction billed today. Changes to MRX improves BCVA.   Dry eye syndrome of both eyes  -discussed using OTC Systane or Refresh. Discussed starting warm compresses daily    RTC 1 year for annual with DFE and MRX

## 2023-12-07 ENCOUNTER — APPOINTMENT (OUTPATIENT)
Dept: OBSTETRICS AND GYNECOLOGY | Facility: CLINIC | Age: 33
End: 2023-12-07
Payer: COMMERCIAL

## 2023-12-07 ENCOUNTER — POSTPARTUM VISIT (OUTPATIENT)
Dept: OBSTETRICS AND GYNECOLOGY | Facility: CLINIC | Age: 33
End: 2023-12-07
Payer: COMMERCIAL

## 2023-12-07 VITALS
SYSTOLIC BLOOD PRESSURE: 116 MMHG | BODY MASS INDEX: 27.29 KG/M2 | HEIGHT: 63 IN | DIASTOLIC BLOOD PRESSURE: 68 MMHG | WEIGHT: 154 LBS

## 2023-12-07 DIAGNOSIS — M79.672 LEFT FOOT PAIN: ICD-10-CM

## 2023-12-07 DIAGNOSIS — E78.2 MODERATE MIXED HYPERLIPIDEMIA NOT REQUIRING STATIN THERAPY: ICD-10-CM

## 2023-12-07 PROCEDURE — 0503F POSTPARTUM CARE VISIT: CPT | Performed by: OBSTETRICS & GYNECOLOGY

## 2023-12-07 RX ORDER — GLUCOSAMINE HCL 500 MG
TABLET ORAL
COMMUNITY
Start: 2020-12-07

## 2023-12-07 RX ORDER — AMOXICILLIN AND CLAVULANATE POTASSIUM 500; 125 MG/1; MG/1
500 TABLET, FILM COATED ORAL 2 TIMES DAILY
Qty: 14 TABLET | Refills: 0 | Status: SHIPPED | OUTPATIENT
Start: 2023-12-07 | End: 2023-12-14

## 2023-12-07 RX ORDER — B-COMPLEX WITH VITAMIN C
1 TABLET ORAL DAILY
COMMUNITY
Start: 2023-03-10

## 2023-12-07 SDOH — ECONOMIC STABILITY: FOOD INSECURITY: WITHIN THE PAST 12 MONTHS, YOU WORRIED THAT YOUR FOOD WOULD RUN OUT BEFORE YOU GOT MONEY TO BUY MORE.: NEVER TRUE

## 2023-12-07 SDOH — ECONOMIC STABILITY: FOOD INSECURITY: WITHIN THE PAST 12 MONTHS, THE FOOD YOU BOUGHT JUST DIDN'T LAST AND YOU DIDN'T HAVE MONEY TO GET MORE.: NEVER TRUE

## 2023-12-07 ASSESSMENT — EDINBURGH POSTNATAL DEPRESSION SCALE (EPDS)
I HAVE FELT SCARED OR PANICKY FOR NO GOOD REASON: NO, NOT AT ALL
I HAVE BEEN ABLE TO LAUGH AND SEE THE FUNNY SIDE OF THINGS: AS MUCH AS I ALWAYS COULD
I HAVE BEEN ANXIOUS OR WORRIED FOR NO GOOD REASON: HARDLY EVER
I HAVE FELT SAD OR MISERABLE: NOT VERY OFTEN
I HAVE BEEN SO UNHAPPY THAT I HAVE BEEN CRYING: NO, NEVER
I HAVE BLAMED MYSELF UNNECESSARILY WHEN THINGS WENT WRONG: NOT VERY OFTEN
TOTAL SCORE: 5
THINGS HAVE BEEN GETTING ON TOP OF ME: NO, MOST OF THE TIME I HAVE COPED QUITE WELL
I HAVE BEEN SO UNHAPPY THAT I HAVE HAD DIFFICULTY SLEEPING: NOT VERY OFTEN
I HAVE LOOKED FORWARD WITH ENJOYMENT TO THINGS: AS MUCH AS I EVER DID
THE THOUGHT OF HARMING MYSELF HAS OCCURRED TO ME: NEVER

## 2023-12-07 ASSESSMENT — SOCIAL DETERMINANTS OF HEALTH (SDOH)
WITHIN THE LAST YEAR, HAVE TO BEEN RAPED OR FORCED TO HAVE ANY KIND OF SEXUAL ACTIVITY BY YOUR PARTNER OR EX-PARTNER?: NO
WITHIN THE LAST YEAR, HAVE YOU BEEN KICKED, HIT, SLAPPED, OR OTHERWISE PHYSICALLY HURT BY YOUR PARTNER OR EX-PARTNER?: NO
WITHIN THE LAST YEAR, HAVE YOU BEEN AFRAID OF YOUR PARTNER OR EX-PARTNER?: NO
WITHIN THE LAST YEAR, HAVE YOU BEEN HUMILIATED OR EMOTIONALLY ABUSED IN OTHER WAYS BY YOUR PARTNER OR EX-PARTNER?: NO

## 2023-12-07 NOTE — PROGRESS NOTES
Post Partum Visit  Subjective    Tamia Eisenberg is a 33 y.o.  presenting for postpartum follow-up:    Delivery Date: 10/21/2023   GA at Delivery: 37.2  Type of Delivery: , Low Transverse  after failed ECV.    Pregnancy was complicated by:  Pregnancy Problems (from 23 to present)       Problem Noted Resolved     delivery delivered 10/25/2023 by TUNG Ivey No    Priority:  Medium      Postoperative anemia due to acute blood loss 10/25/2023 by TUNG Ivey No    Priority:  Medium      36 weeks gestation of pregnancy 10/18/2023 by Mary Fam PA-C No    Priority:  Medium      Breech presentation on examination, fetus 1 10/20/2023 by Massimo Chowdary MD 10/25/2023 by TUNG Ivey    Priority:  Medium      Overview Addendum 10/21/2023 12:04 AM by Emilia Castillo MD     Status post unsuccessful ECV 10/20         Benign gestational thrombocytopenia in third trimester (CMS/HCC) 10/18/2023 by Mary Fam PA-C 10/25/2023 by TUNG Ivey    Priority:  Medium      Overview Addendum 10/20/2023 11:16 AM by Kelsie Hanson MD     Plts 148 on 10/18 from 216 in August         Hypothyroidism in pregnancy, antepartum, third trimester 10/17/2023 by Tawana Beard MD 10/25/2023 by TUNG Ivey    Priority:  Medium      Overview Addendum 10/20/2023 11:15 AM by Kelsie Hanson MD     subclinical hypothyroidism with mild TSH elevation in first trimester, normal T4, now resolved with multiple normal TSH values in third trimester   3/20 TSH 5.15 FT4 0.99   TSH 5.18 FT4 0.9  10/10 TSH 2.32         AMA (advanced maternal age) primigravida 35+, third trimester 10/17/2023 by Tawana Beard MD 10/25/2023 by TUNG Ivey    Priority:  Medium      Overview Addendum 10/20/2023  1:45 PM by Massimo Chowdary MD     RR cfDNA             Concerns: Breast pain, incisional pain, back and leg pain, fatigue, fever 2 days ago,  and a headache. Also notes cracked nipples and blisters. Did not take temperature, just felt feverish.   Fingers are still in AM, but gets better with movement.  Having pain in her left ankle and some weakness as well.     Pain: Pain and itching of incision. Having pain in joints.   Lacerations:   Laceration Repaired?   Perineal:       Periurethral:       Labial:       Sulcus:       Vaginal:       Cervical:           Repair suture:     Number of repair packets:     Blood loss (mL):     Total estimated blood loss (mL): 575     Menses: No    Sexual Health Concerns: No  Contraceptive Method: Condoms    Feeding Method: She is breast feeding exclusively. She is breast feeding with some supplementation. breast tendernesss and poor infant latch  Lactation Consult Needed?: Yes    Birth Trauma: No  Baby:   Information for the patient's :  Dakota Chavez [[id number]]   Dakota Chavez ,   Information for the patient's :  Dakota Chavez [[id number]]   male   Bonding: doing well without issue    Mood:   Normal.         Objective   Vitals:    23 1436   BP: 116/68        Physical Exam  Constitutional:       Appearance: Normal appearance. She is obese.   Genitourinary:      Vulva, bladder and urethral meatus normal.      Right Labia: No skin changes or Bartholin's cyst.     Left Labia: No skin changes or Bartholin's cyst.     No vaginal discharge.      No vaginal prolapse present.       Right Adnexa: not tender and no mass present.     Left Adnexa: not tender and no mass present.     Cervix is not parous.      Uterus is tender.      Uterus is not enlarged.      No urethral tenderness present.   HENT:      Head: Normocephalic and atraumatic.      Nose: Nose normal.      Mouth/Throat:      Mouth: Mucous membranes are moist.   Eyes:      Conjunctiva/sclera: Conjunctivae normal.   Cardiovascular:      Rate and Rhythm: Normal rate and regular rhythm.   Pulmonary:      Effort: Pulmonary effort is normal.      Breath  sounds: Normal breath sounds.   Abdominal:      General: Abdomen is flat. Bowel sounds are normal.      Palpations: Abdomen is soft.   Musculoskeletal:         General: Normal range of motion.      Cervical back: Normal range of motion and neck supple.   Neurological:      General: No focal deficit present.      Mental Status: She is alert and oriented to person, place, and time.   Skin:     General: Skin is warm and dry.   Psychiatric:         Mood and Affect: Mood normal.         Behavior: Behavior normal.         Thought Content: Thought content normal.         Judgment: Judgment normal.   Vitals reviewed.            Assessment/Plan       Options for postpartum family planning were discussed. These included combination oral contraceptive options, progesterone only pills, implantable contraception, intrauterine devices, barrier methods, sterilization, and the rhythm method. The patient's questions were answered. She decided on condoms as her preferred method of contraception.     Follow up: 1 week

## 2023-12-08 ENCOUNTER — LAB (OUTPATIENT)
Dept: LAB | Facility: LAB | Age: 33
End: 2023-12-08
Payer: COMMERCIAL

## 2023-12-08 DIAGNOSIS — E78.2 MODERATE MIXED HYPERLIPIDEMIA NOT REQUIRING STATIN THERAPY: ICD-10-CM

## 2023-12-08 LAB
25(OH)D3 SERPL-MCNC: 30 NG/ML (ref 30–100)
CA-I BLD-SCNC: 1.21 MMOL/L (ref 1.1–1.33)
CHOLEST SERPL-MCNC: 257 MG/DL (ref 0–199)
CHOLESTEROL/HDL RATIO: 4.2
HDLC SERPL-MCNC: 60.7 MG/DL
LDLC SERPL CALC-MCNC: 169 MG/DL
NON HDL CHOLESTEROL: 196 MG/DL (ref 0–149)
T4 FREE SERPL-MCNC: 0.76 NG/DL (ref 0.78–1.48)
TRIGL SERPL-MCNC: 135 MG/DL (ref 0–149)
TSH SERPL-ACNC: 5.53 MIU/L (ref 0.44–3.98)
VLDL: 27 MG/DL (ref 0–40)

## 2023-12-08 PROCEDURE — 82330 ASSAY OF CALCIUM: CPT

## 2023-12-08 PROCEDURE — 84443 ASSAY THYROID STIM HORMONE: CPT

## 2023-12-08 PROCEDURE — 84439 ASSAY OF FREE THYROXINE: CPT

## 2023-12-08 PROCEDURE — 80061 LIPID PANEL: CPT

## 2023-12-08 PROCEDURE — 82306 VITAMIN D 25 HYDROXY: CPT

## 2023-12-08 PROCEDURE — 36415 COLL VENOUS BLD VENIPUNCTURE: CPT

## 2023-12-09 DIAGNOSIS — E03.9 ACQUIRED HYPOTHYROIDISM: Primary | ICD-10-CM

## 2023-12-09 RX ORDER — LEVOTHYROXINE SODIUM 25 UG/1
25 TABLET ORAL
Qty: 30 TABLET | Refills: 11 | Status: SHIPPED | OUTPATIENT
Start: 2023-12-09 | End: 2024-05-24 | Stop reason: WASHOUT

## 2023-12-10 PROCEDURE — RXMED WILLOW AMBULATORY MEDICATION CHARGE

## 2023-12-11 ENCOUNTER — TELEPHONE (OUTPATIENT)
Dept: OBSTETRICS AND GYNECOLOGY | Facility: CLINIC | Age: 33
End: 2023-12-11
Payer: COMMERCIAL

## 2023-12-11 NOTE — TELEPHONE ENCOUNTER
contacted pt  name and  verified  Spoke with pt made aware of lab results, new medication sent to pharmacy for , repeat labs in 8 weeks order already in place and need for follow up with endocrinology needed  Pt has appt scheduled this week with K[facility] 23  pt verbalized understanding  no further questions or concerns at this time

## 2023-12-11 NOTE — TELEPHONE ENCOUNTER
----- Message from Marline Pope RN sent at 12/11/2023  9:02 AM EST -----    ----- Message -----  From: Tawana Beard MD  Sent: 12/8/2023   1:59 PM EST  To: Flavio Arech[facility] Clinical Support Staff    Elevated cholesterol persists, but improved from pregnancy.   HDL is excellent.   TSH mildly elevated, awaiting FT4

## 2023-12-13 ENCOUNTER — OFFICE VISIT (OUTPATIENT)
Dept: ENDOCRINOLOGY | Facility: CLINIC | Age: 33
End: 2023-12-13
Payer: COMMERCIAL

## 2023-12-13 VITALS
HEIGHT: 63 IN | BODY MASS INDEX: 27.11 KG/M2 | HEART RATE: 79 BPM | DIASTOLIC BLOOD PRESSURE: 68 MMHG | SYSTOLIC BLOOD PRESSURE: 103 MMHG | WEIGHT: 153 LBS

## 2023-12-13 DIAGNOSIS — E06.3 HASHIMOTO'S THYROIDITIS: Primary | ICD-10-CM

## 2023-12-13 PROCEDURE — 1036F TOBACCO NON-USER: CPT | Performed by: INTERNAL MEDICINE

## 2023-12-13 PROCEDURE — 99214 OFFICE O/P EST MOD 30 MIN: CPT | Performed by: INTERNAL MEDICINE

## 2023-12-13 RX ORDER — LEVOTHYROXINE SODIUM 50 UG/1
50 TABLET ORAL DAILY
Qty: 30 TABLET | Refills: 11 | Status: SHIPPED | OUTPATIENT
Start: 2023-12-13 | End: 2024-04-29 | Stop reason: SDUPTHER

## 2023-12-13 NOTE — PROGRESS NOTES
33 year old Female Referred for Evaluation of Thyroid Function - OB.   Patient presents today S/P delivery C section (10/21/23).   On the 25 th of August 2021, patient was initially told she has thyroid dysfunction: At the time TSH was 0.08 with a Free T4 of 1.40. Her main Sx at the time included irritability- no Sx otherwise.   On the 16 th of August 2022, prior to pregnancy, 3 months prior to conception: TSH was 5.12 with a Free T4 of 0.93.  Values during Pregnancy are as below - per trimesters. Patient denies being started on Levothyroxine.     Today, she reports breastfeeding. Baby boy is doing well. She denies complications throughout pregnancy. Has not been on hormonal contraception in the past and does not wish to in the future. At the time being, she does not contemplate future pregnancy. She does c/o cramps both upper and lower extremities, poor sleep s/p delivery. She does not attempt to lose weight as she is breastfeeding. Denies ocular sx. Reports Occasional Constipation.  No Recent Contrast Administration.   No Previous Exposure to Radioactive Iodine.     10 pt. ROS reviewed and is otherwise negative.     Past Medical History: None otherwise  Medications: Prenatal MV - contains 30 mcg of Biotin - Folic Acid                        Ferratab 325 mg orally daily - currently taken once daily - will be taking twice daily                         Vitamin D 2000 units taken occasionally  Family History: Mother - Thyroid Disease ? On medication - no thyroidectomy - no Known Fhx of AI Disease                            No Fhx of DM                            No Fhx of Malignancy  Surgeries: C Section ( 10/21/23)   Social: Negative for EtOH, Cannabis, Cigarettes, and other Illicits, no Other OTCs or supplements              Diet: Vegetarian               Exercise: Limited at the time being              Research A. - at      Vitals:    12/13/23 0828   BP: 103/68   Pulse: 79   CCO3 Pleasant   HEENT: Rubbery  Thyroid, no Nodules, [place]ov[place]ck +   Abdo: +BS, +Subcutaneous Fat, Soft nt, no HSM   Chest: CTA, GBAE, No Wheezes  Heart: RRR, Nl S1S2, no Rubs or Murmurs   LE: +pp, no Edema   MSK: Nl Upper and Lower Motor Power and Sensory Exam, No Proximal Myopathy   Reflexes: Delayed Relaxation Phase - Upper Extremities B/L           Assessment/Plan:   # Hashimoto's Hypothyroidism   # Vitamin D Deficiency   # Postpartum  Date of Delivery: (10/21/23)  - Clinically Hypothyroid  - Biochemically Hypothyroid  - Labs ordered today (12/13/23) for the 4th week of January 2024: PTH, Anti-TPO,TSH, FT4                                                                                                              RFP, PTH  - In the light of the Strong Clinical suspicion of Hashimoto's Hypothyroidism, patient is informed that 20% of Hashimoto's can present with negative anti-TPO.   - Recommendations provided to start Levothyroxine at a dose of 50 mcg orally daily on an empty stomach at least 30 minutes prior to breakfast, away from the rest of the medications (listed above). Refills provided.   - Patient is encouraged to adhere to Vitamin D as well - currently taking 2000 units daily.   - Patient is informed that in case of pregnancy, she will likely need close follow up with Levothyroxine dose titration as required.   - She is agreeable to Plan.     Will follow up and update the patient accordingly on the above ordered Labs.   Return to Care to be determined.   Patient is discussed, seen, and examined by Dr. Contreras.

## 2023-12-14 ENCOUNTER — PHARMACY VISIT (OUTPATIENT)
Dept: PHARMACY | Facility: CLINIC | Age: 33
End: 2023-12-14
Payer: COMMERCIAL

## 2023-12-14 ENCOUNTER — DOCUMENTATION (OUTPATIENT)
Dept: OBSTETRICS AND GYNECOLOGY | Facility: CLINIC | Age: 33
End: 2023-12-14

## 2023-12-14 ENCOUNTER — POSTPARTUM VISIT (OUTPATIENT)
Dept: OBSTETRICS AND GYNECOLOGY | Facility: CLINIC | Age: 33
End: 2023-12-14
Payer: COMMERCIAL

## 2023-12-14 VITALS
WEIGHT: 151 LBS | SYSTOLIC BLOOD PRESSURE: 118 MMHG | BODY MASS INDEX: 26.75 KG/M2 | HEIGHT: 63 IN | DIASTOLIC BLOOD PRESSURE: 72 MMHG

## 2023-12-14 PROCEDURE — 0503F POSTPARTUM CARE VISIT: CPT | Performed by: OBSTETRICS & GYNECOLOGY

## 2023-12-14 ASSESSMENT — ENCOUNTER SYMPTOMS: ABDOMINAL PAIN: 1

## 2023-12-14 NOTE — PROGRESS NOTES
Called physical therapy was able to get next available appt for pt on 01/05/23 @0800 at [facility]. Pt left office before confirmation, pt can reschedule if date/time does not work for her.

## 2023-12-14 NOTE — PROGRESS NOTES
Subjective   Patient ID: Tamia Eisenberg is a 33 y.o. female who presents for Abdominal Pain (Patient states that she feels that the medications has helped/No pain).  Follow-up on endometritis. Abdominal pain has improved.  Still having some joint pain. Has not been able to schedule due to problems reaching the PT offices.   Pain in abdomen has improved.   Saw endocrine yesterday for hypothyroidism.   Worried about returning to work with the joint pain.     Abdominal Pain      Review of Systems   Gastrointestinal:  Positive for abdominal pain.     Objective   Physical Exam  Constitutional:       Appearance: Normal appearance.   HENT:      Head: Normocephalic and atraumatic.      Nose: Nose normal.   Genitourinary:     General: Normal vulva.      Vagina: Normal.      Uterus: Normal.       Adnexa: Right adnexa normal and left adnexa normal.   Neurological:      Mental Status: She is alert.       Assessment/Plan   Problem List Items Addressed This Visit    None  Visit Diagnoses         Codes    Puerperal endometritis, postpartum condition or complication    -  Primary O86.12                 Tawana Beard MD 12/14/23 11:18 AM

## 2023-12-18 ENCOUNTER — APPOINTMENT (OUTPATIENT)
Dept: ORTHOPEDIC SURGERY | Facility: HOSPITAL | Age: 33
End: 2023-12-18
Payer: COMMERCIAL

## 2023-12-20 ENCOUNTER — OFFICE VISIT (OUTPATIENT)
Dept: ORTHOPEDIC SURGERY | Facility: HOSPITAL | Age: 33
End: 2023-12-20
Payer: COMMERCIAL

## 2023-12-20 ENCOUNTER — APPOINTMENT (OUTPATIENT)
Dept: LAB | Facility: LAB | Age: 33
End: 2023-12-20
Payer: COMMERCIAL

## 2023-12-20 DIAGNOSIS — M25.511 ACUTE PAIN OF BOTH SHOULDERS: Primary | ICD-10-CM

## 2023-12-20 DIAGNOSIS — M25.50 MULTIPLE JOINT PAIN: ICD-10-CM

## 2023-12-20 DIAGNOSIS — M25.512 ACUTE PAIN OF BOTH SHOULDERS: Primary | ICD-10-CM

## 2023-12-20 PROCEDURE — 86140 C-REACTIVE PROTEIN: CPT

## 2023-12-20 PROCEDURE — 86038 ANTINUCLEAR ANTIBODIES: CPT

## 2023-12-20 PROCEDURE — 99212 OFFICE O/P EST SF 10 MIN: CPT | Performed by: STUDENT IN AN ORGANIZED HEALTH CARE EDUCATION/TRAINING PROGRAM

## 2023-12-20 PROCEDURE — 86235 NUCLEAR ANTIGEN ANTIBODY: CPT

## 2023-12-20 PROCEDURE — 85652 RBC SED RATE AUTOMATED: CPT

## 2023-12-20 PROCEDURE — 85025 COMPLETE CBC W/AUTO DIFF WBC: CPT

## 2023-12-20 PROCEDURE — 99202 OFFICE O/P NEW SF 15 MIN: CPT | Performed by: STUDENT IN AN ORGANIZED HEALTH CARE EDUCATION/TRAINING PROGRAM

## 2023-12-20 PROCEDURE — 86225 DNA ANTIBODY NATIVE: CPT

## 2023-12-20 PROCEDURE — 86431 RHEUMATOID FACTOR QUANT: CPT

## 2023-12-20 PROCEDURE — 84550 ASSAY OF BLOOD/URIC ACID: CPT

## 2023-12-20 PROCEDURE — 86200 CCP ANTIBODY: CPT

## 2023-12-20 ASSESSMENT — PAIN SCALES - GENERAL: PAINLEVEL_OUTOF10: 7

## 2023-12-20 ASSESSMENT — PAIN DESCRIPTION - DESCRIPTORS: DESCRIPTORS: ACHING

## 2023-12-20 ASSESSMENT — PAIN - FUNCTIONAL ASSESSMENT: PAIN_FUNCTIONAL_ASSESSMENT: 0-10

## 2024-01-05 ENCOUNTER — APPOINTMENT (OUTPATIENT)
Dept: PHYSICAL THERAPY | Facility: CLINIC | Age: 34
End: 2024-01-05
Payer: COMMERCIAL

## 2024-02-07 ENCOUNTER — LAB (OUTPATIENT)
Dept: LAB | Facility: LAB | Age: 34
End: 2024-02-07
Payer: COMMERCIAL

## 2024-02-07 ENCOUNTER — APPOINTMENT (OUTPATIENT)
Dept: ORTHOPEDIC SURGERY | Facility: HOSPITAL | Age: 34
End: 2024-02-07
Payer: COMMERCIAL

## 2024-02-07 DIAGNOSIS — E06.3 HASHIMOTO'S THYROIDITIS: ICD-10-CM

## 2024-02-07 LAB
ALBUMIN SERPL BCP-MCNC: 4.1 G/DL (ref 3.4–5)
ANION GAP SERPL CALC-SCNC: 11 MMOL/L (ref 10–20)
BUN SERPL-MCNC: 13 MG/DL (ref 6–23)
CALCIUM SERPL-MCNC: 9.4 MG/DL (ref 8.6–10.6)
CHLORIDE SERPL-SCNC: 106 MMOL/L (ref 98–107)
CO2 SERPL-SCNC: 26 MMOL/L (ref 21–32)
CREAT SERPL-MCNC: 0.64 MG/DL (ref 0.5–1.05)
EGFRCR SERPLBLD CKD-EPI 2021: >90 ML/MIN/1.73M*2
GLUCOSE SERPL-MCNC: 86 MG/DL (ref 74–99)
PHOSPHATE SERPL-MCNC: 3.6 MG/DL (ref 2.5–4.9)
POTASSIUM SERPL-SCNC: 4.3 MMOL/L (ref 3.5–5.3)
PTH-INTACT SERPL-MCNC: 96.5 PG/ML (ref 18.5–88)
SODIUM SERPL-SCNC: 139 MMOL/L (ref 136–145)
T4 FREE SERPL-MCNC: 1.05 NG/DL (ref 0.78–1.48)
THYROPEROXIDASE AB SERPL-ACNC: 32 IU/ML
TSH SERPL-ACNC: 1.96 MIU/L (ref 0.44–3.98)

## 2024-02-07 PROCEDURE — 84443 ASSAY THYROID STIM HORMONE: CPT

## 2024-02-07 PROCEDURE — 84439 ASSAY OF FREE THYROXINE: CPT

## 2024-02-07 PROCEDURE — 80069 RENAL FUNCTION PANEL: CPT

## 2024-02-07 PROCEDURE — 86376 MICROSOMAL ANTIBODY EACH: CPT

## 2024-02-07 PROCEDURE — 36415 COLL VENOUS BLD VENIPUNCTURE: CPT

## 2024-02-07 PROCEDURE — 83970 ASSAY OF PARATHORMONE: CPT

## 2024-04-29 DIAGNOSIS — E06.3 HASHIMOTO'S THYROIDITIS: ICD-10-CM

## 2024-04-29 RX ORDER — LEVOTHYROXINE SODIUM 50 UG/1
50 TABLET ORAL DAILY
Qty: 30 TABLET | Refills: 11 | Status: SHIPPED | OUTPATIENT
Start: 2024-04-29 | End: 2024-05-30 | Stop reason: SDUPTHER

## 2024-05-24 ENCOUNTER — OFFICE VISIT (OUTPATIENT)
Dept: PRIMARY CARE | Facility: CLINIC | Age: 34
End: 2024-05-24
Payer: COMMERCIAL

## 2024-05-24 VITALS
DIASTOLIC BLOOD PRESSURE: 68 MMHG | SYSTOLIC BLOOD PRESSURE: 113 MMHG | OXYGEN SATURATION: 98 % | HEIGHT: 63 IN | WEIGHT: 151 LBS | BODY MASS INDEX: 26.75 KG/M2 | HEART RATE: 82 BPM

## 2024-05-24 DIAGNOSIS — Z00.00 HEALTH CARE MAINTENANCE: Primary | ICD-10-CM

## 2024-05-24 DIAGNOSIS — E55.9 VITAMIN D DEFICIENCY: ICD-10-CM

## 2024-05-24 PROBLEM — D62 POSTOPERATIVE ANEMIA DUE TO ACUTE BLOOD LOSS: Status: RESOLVED | Noted: 2023-10-25 | Resolved: 2024-05-24

## 2024-05-24 PROBLEM — Z3A.36 36 WEEKS GESTATION OF PREGNANCY (HHS-HCC): Status: RESOLVED | Noted: 2023-10-18 | Resolved: 2024-05-24

## 2024-05-24 PROCEDURE — 99395 PREV VISIT EST AGE 18-39: CPT | Performed by: INTERNAL MEDICINE

## 2024-05-24 PROCEDURE — 1036F TOBACCO NON-USER: CPT | Performed by: INTERNAL MEDICINE

## 2024-05-24 RX ORDER — SERTRALINE HYDROCHLORIDE 50 MG/1
50 TABLET, FILM COATED ORAL DAILY
Qty: 30 TABLET | Refills: 1 | Status: SHIPPED | OUTPATIENT
Start: 2024-05-24 | End: 2024-07-23

## 2024-05-24 RX ORDER — CHOLECALCIFEROL (VITAMIN D3) 50 MCG
50 TABLET ORAL DAILY
Qty: 30 TABLET | Refills: 11 | Status: SHIPPED | OUTPATIENT
Start: 2024-05-24 | End: 2025-05-24

## 2024-05-24 NOTE — PROGRESS NOTES
"Subjective   Patient ID: Tamia Eisenberg is a 33 y.o. female who presents for Annual Exam.          HPI     Patient is a 33-year-old female with past medical history of hypothyroidism, vitamin D deficiency presents for wellness exam.  Patient's main concern is ongoing fatigue.  She did have a baby 6 months ago.  She went back to work 2 months after delivering.  She does not get a lot of sleep and she works a full day.  Her  also participates in childcare.  She is slightly anemic.  Although the CBC was done post delivery.  Does not take any iron supplementation.  She stopped taking the vitamin D supplementation as well.    No other complaints at this time.  Her weight is stable.  Blood pressure is well-controlled.  She does not exercise very often.  She eats a well-balanced diet.  No illicit substances smoking or alcohol    Review of Systems  Constitutional: No fever or chills, No Night Sweats  Eyes: No Blurry Vision or Eye sight problems  ENT: No Nasal Discharge, Hoarseness, sore throat  Cardiovascular: no chest pain, no palpitations and no syncope.   Respiratory: no cough, no shortness of breath during exertion and no shortness of breath at rest.   Gastrointestinal: no abdominal pain, no nausea and no vomiting.   : No vaginal discharge, burning with urination, no blood in urine or stools  Skin: No Skin rashes or Lesions  Neuro: No Headache, no dizziness or Numbness or tingling  Psych: No Anxiety, depression or sleeping problems  Heme: No Easy bleeding or brusing.     Objective   /68   Pulse 82   Ht 1.6 m (5' 3\")   Wt 68.5 kg (151 lb)   SpO2 98%   BMI 26.75 kg/m²     Physical Exam  Patient declined Chaperone  Constitutional: Alert and in no acute distress. Well developed, well nourished.   Head and Face: Head and face: Normal.    Eyes: Normal external exam. Pupils were equal in size, round, reactive to light (PERRL) with normal accommodation and extraocular movements intact (EOMI).   Ears, Nose, " Mouth, and Throat: External inspection of ears and nose: Normal.  Hearing: Normal.  Nasal mucosa, septum, and turbinates: Normal.  Lips, teeth, and gums: Normal.  Oropharynx: Normal.   Neck: No neck mass was observed. Supple. Thyroid not enlarged and there were no palpable thyroid nodules.   Cardiovascular: Heart rate and rhythm were normal, normal S1 and S2. Pedal pulses: Normal. No peripheral edema.   Pulmonary: No respiratory distress. Clear bilateral breath sounds.   Breast: Normal Appearance, No Masses or lumps palpated  Abdomen: Soft nontender; no abdominal mass palpated. Normal bowel sounds. No organomegaly.   : Deferred   Musculoskeletal: No joint swelling seen, normal movements of all extremities. Range of motion: Normal.  Muscle strength/tone: Normal.    Skin: Normal skin color and pigmentation, normal skin turgor, and no rash.   Neurologic: Deep tendon reflexes were 2+ and symmetric.   Psychiatric: Judgment and insight: Intact. Mood and affect: Normal.  Lymphatic: No cervical lymphadenopathy. Palpation of lymph nodes in axillae: Normal.  Palpation of lymph nodes in groin: Normal.    Lab Results   Component Value Date    WBC 6.7 2023    HGB 13.0 2023    HCT 40.8 2023     2023    CHOL 257 (H) 2023    TRIG 135 2023    HDL 60.7 2023    ALT 7 10/18/2023    AST 16 10/18/2023     2024    K 4.3 2024     2024    CREATININE 0.64 2024    BUN 13 2024    CO2 26 2024    TSH 1.96 2024    INR 1.0 10/21/2023    HGBA1C 5.2 2020       US OB follow UP transabdominal approach  Interpreted By:  TRACEY PETERS MD  Indication  ========     Follow Up to Complete Anatomical Study , AMA Primigravida     History  ======     General History  Smoking:  no  Height  163 cm  Height (ft)  5 ft  Height (in)  4 in  Previous Outcomes    1  Para  0     Maternal Assessment  =================     Height  163 cm  Height (ft)  5  ft  Height (in)  4 in  Weight  57 kg  Weight (lb)  126 lb  Weight gain  0 kg  Weight gain (lb)  0 lb  BMI  21.63 kg/m?  Physical Exam  Initial weight (lb)  126 lb     Pregnancy  =========     Kelly pregnancy. Number of fetuses: 1     Dating  ======     LMP on:  1/26/2023  Cycle:  irregular cycle, Very certain LMP  GA by LMP  22 w + 1 d  MARYANNE by LMP:  11/2/2023  Conception:  LMP  GA by prior assessment  21 w + 1 d  MARYANNE by prior assessment:  11/9/2023  Assigned:  based on ultrasound (CRL), selected on 04/3/2023  Assigned GA  21 w + 1 d  Assigned MARYANNE:  11/9/2023     Fetal Growth Overview  =================     Exam date        GA              BPD (mm)          HC (mm)                AC (mm)               FL  (mm)             HL (mm)            EFW (g)  06/16/2023        19w 1d        44.5     64%        169.1    62%          135.1     40%        31.2     63%        31.5     85%        287    56%     Impression  =========     The patient returns for completion of heart views.  -Fetal biometry not repeated due to the short scan interval  -The anatomic survey was completed  -No malformations were identified.  A normal sonogram cannot exclude all structural and functional   problems.     Thank you for allowing us to participate in the care of your patient     Follow-up  ========     None scheduled; follow up as clinically indicated.     General Evaluation  ==============     Cardiac activity present.  bpm. Fetal movements: visualized.   Presentation: transverse  Placenta: Placental site: anterior, fundal  Umbilical cord: Cord vessels: 3 vessel cord  Amniotic fluid: Amount of AF: normal amount     Fetal Biometry  ============     Standard  EFW by:  Hadlock (BPD-AC-FL)  Extended    5.0 mm  Other Structures  FHR  142 bpm     Fetal Anatomy  ===========     The following structures appear normal:  4-chamber view. LVOT view. RVOT view. 3-vessel view. 3-vessel-trachea   view. Aortic arch view.  Bicaval view.     Fetal  sex: male     Maternal Structures  ===============     Uterus / Cervix  Uterus:  Visualized  Cervix:  Visualized  Cervical length  32.3 mm  Ovaries / Tubes / Adnexa  Rt ovary:  Visualized  Lt ovary:  Not visualized     Method  ======     Transabdominal ultrasound examination. View: Suboptimal view: limited   by fetal position      Assessment/Plan   Problem List Items Addressed This Visit             ICD-10-CM    Vitamin D deficiency E55.9    Relevant Medications    cholecalciferol (Vitamin D3) 50 MCG (2000 UT) tablet    Other Relevant Orders    Vitamin D 25-Hydroxy,Total (for eval of Vitamin D levels)     Other Visit Diagnoses         Codes    Health care maintenance    -  Primary Z00.00    Relevant Orders    Lipid Panel    Vitamin B12    TSH with reflex to Free T4 if abnormal    Iron and TIBC    Post partum depression     F53.0    Relevant Medications    sertraline (Zoloft) 50 mg tablet          Possible postpartum depression.  Start sertraline 50 mg once daily.  Follow-up if no improvement in 30 days.  Check screening labs as well as B12 vitamin D TSH    Dear Tamia Eisenberg     It was my pleasure to take care of you today in the office. Below are the things we discussed today:    1. 1. Immunizations: Yearly Flu shot is recommended.          a: COVID: Up-to-Date         b: Tetanus: Up-to-date            2. Blood Work: Ordered  3. Seen your dentist twice a year  4. Yearly Eye exam is recommended    5. BMI: 26.8  6: Diet recommendations:   Eat Clean, Try to have as many home cooked meals as possible  Avoid processed foods which contain excess calories, sugar, and sodium.    7. Exercise recommendations:   150 minutes a week to maintain your weight     If you have to loose weight, you need a better diet and exercise plan.     8. Supplements recommended:  a - Calcium 600 mg up to twice a day to get a total of 1200 mg. Each 8 oz of milk or yogurt or 1 oz of cheese, 1 Banana, 1 serving of green Leafy vegetable has about 300  mg of Calcium, so you may subtract that amount. Calcium citrate is the only acceptable supplement to take if you take an acid suppressing medication like Prilosec; otherwise Calcium carbonate is acceptable too (It can cause Constipation).   b - Vitamin D - 2000 IU daily     9. Please get your Living will / Advance directive completed if you do not have one already. Please make sure our office has a copy of the latest one.     10. Colonoscopy: Uptodate, repeat in   11. Mammogram: Uptodate,   12. PAP: Follow-up with Day  13. DEXA: N/A  14: Skin Check: Please see Dermatology once a year for a Skin Check.     15.  Follow-up as needed    Follow up in one year for a Physical. Please call the office before your Physical to see if you need blood work completed prior to your physical.     Please call me if any questions arise from now until your next visit. I will call you after I am done seeing patients. A Doctor is always available by phone when the office is closed. Please feel free to call for help with any problem that you feel shouldn't wait until the office re-opens.     Anuel Lopez, DO

## 2024-05-29 ENCOUNTER — LAB (OUTPATIENT)
Dept: LAB | Facility: LAB | Age: 34
End: 2024-05-29
Payer: COMMERCIAL

## 2024-05-29 DIAGNOSIS — E55.9 VITAMIN D DEFICIENCY: ICD-10-CM

## 2024-05-29 DIAGNOSIS — Z00.00 HEALTH CARE MAINTENANCE: ICD-10-CM

## 2024-05-29 LAB
25(OH)D3 SERPL-MCNC: 30 NG/ML (ref 30–100)
CHOLEST SERPL-MCNC: 248 MG/DL (ref 0–199)
CHOLESTEROL/HDL RATIO: 4.9
HDLC SERPL-MCNC: 50.2 MG/DL
IRON SATN MFR SERPL: 17 % (ref 25–45)
IRON SERPL-MCNC: 65 UG/DL (ref 35–150)
LDLC SERPL CALC-MCNC: 155 MG/DL
NON HDL CHOLESTEROL: 198 MG/DL (ref 0–149)
TIBC SERPL-MCNC: 391 UG/DL (ref 240–445)
TRIGL SERPL-MCNC: 215 MG/DL (ref 0–149)
TSH SERPL-ACNC: 1.8 MIU/L (ref 0.44–3.98)
UIBC SERPL-MCNC: 326 UG/DL (ref 110–370)
VIT B12 SERPL-MCNC: 903 PG/ML (ref 211–911)
VLDL: 43 MG/DL (ref 0–40)

## 2024-05-29 PROCEDURE — 84443 ASSAY THYROID STIM HORMONE: CPT

## 2024-05-29 PROCEDURE — 80061 LIPID PANEL: CPT

## 2024-05-29 PROCEDURE — 36415 COLL VENOUS BLD VENIPUNCTURE: CPT

## 2024-05-29 PROCEDURE — 83540 ASSAY OF IRON: CPT

## 2024-05-29 PROCEDURE — 82306 VITAMIN D 25 HYDROXY: CPT

## 2024-05-29 PROCEDURE — 82607 VITAMIN B-12: CPT

## 2024-05-29 PROCEDURE — 83550 IRON BINDING TEST: CPT

## 2024-05-30 DIAGNOSIS — E06.3 HASHIMOTO'S THYROIDITIS: ICD-10-CM

## 2024-05-31 RX ORDER — LEVOTHYROXINE SODIUM 50 UG/1
TABLET ORAL
Qty: 90 TABLET | Refills: 3 | Status: SHIPPED | OUTPATIENT
Start: 2024-05-31

## 2024-06-20 RX ORDER — SERTRALINE HYDROCHLORIDE 50 MG/1
50 TABLET, FILM COATED ORAL DAILY
Qty: 90 TABLET | Refills: 2 | Status: SHIPPED | OUTPATIENT
Start: 2024-06-20

## 2024-07-31 NOTE — PROGRESS NOTES
[facility]   Hand and Upper Extremity Service  Initial evaluation / Consultation        Consult requested by Referring Physician: None     Chief Complaint: Bilateral shoulder pain     Patient is a pleasant 33-year-old female who presents with bilateral shoulder pain.  She notes pain in multiple joints.  She notes this is worse in the morning.  She notes it is worse with activity.  She notes it is better with rest.  The pain is moderate.  She notes soreness and tenderness.  She denies any acute injuries.  She denies any fevers or chills.  She denies any infections.     Please refer to New Patient Intake Form scanned into patient's electronic record for self reported past medical history, past surgical history, medications, allergies, family history, social history and 10 point review of systems     Examination:  Constitutional: Oriented to person, place, and time.  Appears well-developed and well-nourished.  Head: Normocephalic and atraumatic.  Eyes: Pupils are equal, round, and reactive to light.  Cardiovascular: Intact distal pulses.   Pulmonary/Chest/Breast: Effort normal. No respiratory distress.  Neurological: Alert and oriented to person, place, and time.  Skin: Skin is warm and dry.  Psychiatric: normal mood and affect.  Behavior is normal.  Musculoskeletal: Bilateral upper extremities  Nontender to palpation to the biceps tendon and subacromial space.  Negative Ibarra and Neer's test.  Symmetrical and full range of motion to both shoulders, elbows, wrist, hand, hand and fingers.  5 out of 5 shoulder abduction.  Negative empty can test.  Negative Omaira's test.  AIN, PIN, ulnar nerves intact.  Sensation intact light touch to radial, ulnar, median nerves       Personal Interpretation of Diagnostic studies: None         Impression: Pain in multiple joints         In Office Procedures Performed: None         Medical Decision Making:   Patient was seen and evaluated today.  There  is no specific site of pain that I can pinpoint on exam.  She does note pain in multiple joints.  I am concerned that she may have an inflammatory arthropathy.  I will order inflammatory labs.  She will follow-up afterwards         Medications Prescribed: None        Follow up: After lab result           Will Beucler, DO  [facility]  Department of Orthopaedic Surgery  Hand and Upper Extremity Reconstruction           Dictation performed with the use of voice recognition software.  Syntax and grammatical errors may exist.

## 2025-01-14 ENCOUNTER — LAB (OUTPATIENT)
Dept: LAB | Facility: LAB | Age: 35
End: 2025-01-14
Payer: COMMERCIAL

## 2025-01-14 ENCOUNTER — OFFICE VISIT (OUTPATIENT)
Dept: PRIMARY CARE | Facility: CLINIC | Age: 35
End: 2025-01-14
Payer: COMMERCIAL

## 2025-01-14 VITALS — SYSTOLIC BLOOD PRESSURE: 106 MMHG | HEART RATE: 78 BPM | DIASTOLIC BLOOD PRESSURE: 81 MMHG

## 2025-01-14 DIAGNOSIS — E78.2 MODERATE MIXED HYPERLIPIDEMIA NOT REQUIRING STATIN THERAPY: ICD-10-CM

## 2025-01-14 DIAGNOSIS — R07.89 ATYPICAL CHEST PAIN: ICD-10-CM

## 2025-01-14 DIAGNOSIS — R00.2 PALPITATIONS: Primary | ICD-10-CM

## 2025-01-14 DIAGNOSIS — R00.2 PALPITATIONS: ICD-10-CM

## 2025-01-14 LAB
ALBUMIN SERPL BCP-MCNC: 4.3 G/DL (ref 3.4–5)
ANION GAP SERPL CALC-SCNC: 11 MMOL/L (ref 10–20)
BUN SERPL-MCNC: 10 MG/DL (ref 6–23)
CALCIUM SERPL-MCNC: 9.6 MG/DL (ref 8.6–10.6)
CHLORIDE SERPL-SCNC: 104 MMOL/L (ref 98–107)
CHOLEST SERPL-MCNC: 222 MG/DL (ref 0–199)
CHOLESTEROL/HDL RATIO: 4.7
CO2 SERPL-SCNC: 27 MMOL/L (ref 21–32)
CREAT SERPL-MCNC: 0.58 MG/DL (ref 0.5–1.05)
EGFRCR SERPLBLD CKD-EPI 2021: >90 ML/MIN/1.73M*2
ERYTHROCYTE [DISTWIDTH] IN BLOOD BY AUTOMATED COUNT: 13.2 % (ref 11.5–14.5)
GLUCOSE SERPL-MCNC: 98 MG/DL (ref 74–99)
HCT VFR BLD AUTO: 41.8 % (ref 36–46)
HDLC SERPL-MCNC: 47.4 MG/DL
HGB BLD-MCNC: 13.4 G/DL (ref 12–16)
LDLC SERPL CALC-MCNC: 141 MG/DL
MCH RBC QN AUTO: 27 PG (ref 26–34)
MCHC RBC AUTO-ENTMCNC: 32.1 G/DL (ref 32–36)
MCV RBC AUTO: 84 FL (ref 80–100)
NON HDL CHOLESTEROL: 175 MG/DL (ref 0–149)
NRBC BLD-RTO: 0 /100 WBCS (ref 0–0)
PHOSPHATE SERPL-MCNC: 3.2 MG/DL (ref 2.5–4.9)
PLATELET # BLD AUTO: 275 X10*3/UL (ref 150–450)
POTASSIUM SERPL-SCNC: 4.1 MMOL/L (ref 3.5–5.3)
RBC # BLD AUTO: 4.96 X10*6/UL (ref 4–5.2)
SODIUM SERPL-SCNC: 138 MMOL/L (ref 136–145)
TRIGL SERPL-MCNC: 166 MG/DL (ref 0–149)
TSH SERPL-ACNC: 3.87 MIU/L (ref 0.44–3.98)
VLDL: 33 MG/DL (ref 0–40)
WBC # BLD AUTO: 7.4 X10*3/UL (ref 4.4–11.3)

## 2025-01-14 PROCEDURE — 84443 ASSAY THYROID STIM HORMONE: CPT

## 2025-01-14 PROCEDURE — 80069 RENAL FUNCTION PANEL: CPT

## 2025-01-14 PROCEDURE — 93000 ELECTROCARDIOGRAM COMPLETE: CPT | Performed by: INTERNAL MEDICINE

## 2025-01-14 PROCEDURE — 80061 LIPID PANEL: CPT

## 2025-01-14 PROCEDURE — 1036F TOBACCO NON-USER: CPT | Performed by: INTERNAL MEDICINE

## 2025-01-14 PROCEDURE — 85027 COMPLETE CBC AUTOMATED: CPT

## 2025-01-14 PROCEDURE — 99214 OFFICE O/P EST MOD 30 MIN: CPT | Performed by: INTERNAL MEDICINE

## 2025-01-14 NOTE — PROGRESS NOTES
Subjective   Patient ID: Tamia Eisenberg is a 34 y.o. female who presents for No chief complaint on file..    HPI     Patient is a 34-year-old female with past medical history of hypothyroidism and anxiety who presents with chief complaint of single episode of palpitations that occurred yesterday.  She went to the emergency room however the long wait line forced her to leave.  She states it was an 8-hour wait.  She then went to the urgent care.  She states that they checked her vitals and stated that they could not evaluate her complaint further.  She denies any shortness of breath.  Symptoms began with palpitations that began yesterday at 5:00 and lasted for 5 to 10 minutes.  She states the highest heart rate was 144 for a very short period of time however her heart rate subsequently remained close to the 100s and subsequently returned back to normal.  She was not exerting herself at the time.  She was simply walking around the parking lot and in her house.  She is never had any history of cardiac events.  No shortness of breath and no chest pain at this time.      Review of Systems  Constitutional: No fever or chills  Cardiovascular: no chest pain, no palpitations and no syncope.   Respiratory: no cough, no shortness of breath during exertion and no shortness of breath at rest.   Gastrointestinal: no abdominal pain, no nausea and no vomiting.  Neuro: No Headache, no dizziness    Objective   /81   Pulse 78     Physical Exam  Constitutional: Alert and in no acute distress. Well developed, well nourished  Head and Face: Head and face: Normal.    Cardiovascular: Heart rate and rhythm were normal, normal S1 and S2. No peripheral edema.   Pulmonary: No respiratory distress. Clear bilateral breath sounds.  Musculoskeletal: Gait and station: Normal. Muscle strength/tone: Normal.   Skin: Normal skin color and pigmentation, normal skin turgor, and no rash.    Psychiatric: Judgment and insight: Intact. Mood and affect:  Normal.    Procedures    Lab Results   Component Value Date    WBC 6.7 2023    HGB 13.0 2023    HCT 40.8 2023     2023    CHOL 248 (H) 2024    TRIG 215 (H) 2024    HDL 50.2 2024    ALT 7 10/18/2023    AST 16 10/18/2023     2024    K 4.3 2024     2024    CREATININE 0.64 2024    BUN 13 2024    CO2 26 2024    TSH 1.80 2024    INR 1.0 10/21/2023    HGBA1C 5.2 2020       US OB follow UP transabdominal approach  Interpreted By:  TRACEY PETERS MD  Indication  ========     Follow Up to Complete Anatomical Study , AMA Primigravida     History  ======     General History  Smoking:  no  Height  163 cm  Height (ft)  5 ft  Height (in)  4 in  Previous Outcomes    1  Para  0     Maternal Assessment  =================     Height  163 cm  Height (ft)  5 ft  Height (in)  4 in  Weight  57 kg  Weight (lb)  126 lb  Weight gain  0 kg  Weight gain (lb)  0 lb  BMI  21.63 kg/m?  Physical Exam  Initial weight (lb)  126 lb     Pregnancy  =========     Kelly pregnancy. Number of fetuses: 1     Dating  ======     LMP on:  2023  Cycle:  irregular cycle, Very certain LMP  GA by LMP  22 w + 1 d  MARYANNE by LMP:  2023  Conception:  LMP  GA by prior assessment  21 w + 1 d  MARYANNE by prior assessment:  2023  Assigned:  based on ultrasound (CRL), selected on 04/3/2023  Assigned GA  21 w + 1 d  Assigned MARYANNE:  2023     Fetal Growth Overview  =================     Exam date        GA              BPD (mm)          HC (mm)                AC (mm)               FL  (mm)             HL (mm)            EFW (g)  2023        19w 1d        44.5     64%        169.1    62%          135.1     40%        31.2     63%        31.5     85%        287    56%     Impression  =========     The patient returns for completion of heart views.  -Fetal biometry not repeated due to the short scan interval  -The anatomic survey was  completed  -No malformations were identified.  A normal sonogram cannot exclude all structural and functional   problems.     Thank you for allowing us to participate in the care of your patient     Follow-up  ========     None scheduled; follow up as clinically indicated.     General Evaluation  ==============     Cardiac activity present.  bpm. Fetal movements: visualized.   Presentation: transverse  Placenta: Placental site: anterior, fundal  Umbilical cord: Cord vessels: 3 vessel cord  Amniotic fluid: Amount of AF: normal amount     Fetal Biometry  ============     Standard  EFW by:  Hadlock (BPD-AC-FL)  Extended    5.0 mm  Other Structures  FHR  142 bpm     Fetal Anatomy  ===========     The following structures appear normal:  4-chamber view. LVOT view. RVOT view. 3-vessel view. 3-vessel-trachea   view. Aortic arch view.  Bicaval view.     Fetal sex: male     Maternal Structures  ===============     Uterus / Cervix  Uterus:  Visualized  Cervix:  Visualized  Cervical length  32.3 mm  Ovaries / Tubes / Adnexa  Rt ovary:  Visualized  Lt ovary:  Not visualized     Method  ======     Transabdominal ultrasound examination. View: Suboptimal view: limited   by fetal position            Assessment/Plan   Problem List Items Addressed This Visit    None  Visit Diagnoses         Codes    Palpitations    -  Primary R00.2    Relevant Orders    Transthoracic Echo (TTE) Complete    ECG 12 lead (Clinic Performed)    CBC    Lipid Panel    Renal function panel    TSH with reflex to Free T4 if abnormal    Atypical chest pain     R07.89    Relevant Orders    Transthoracic Echo (TTE) Complete    ECG 12 lead (Clinic Performed)    CBC    Lipid Panel    Renal function panel    TSH with reflex to Free T4 if abnormal          Considering symptoms of palpitations and lightheadedness for short period of time it may be that it is a transient occurrence however would workup further.  Will check EKG and echocardiogram.  Less likely  to be coronary artery disease given patient's history young age and atypical presentation.  Advised patient to maintain diary of symptoms and if they occur document and return to clinic in 1 month for reevaluation

## 2025-01-28 ENCOUNTER — HOSPITAL ENCOUNTER (OUTPATIENT)
Dept: CARDIOLOGY | Facility: HOSPITAL | Age: 35
Discharge: HOME | End: 2025-01-28
Payer: COMMERCIAL

## 2025-01-28 DIAGNOSIS — R00.2 PALPITATIONS: ICD-10-CM

## 2025-01-28 DIAGNOSIS — E78.5 HYPERLIPIDEMIA, UNSPECIFIED HYPERLIPIDEMIA TYPE: Primary | ICD-10-CM

## 2025-01-28 DIAGNOSIS — R07.89 ATYPICAL CHEST PAIN: ICD-10-CM

## 2025-01-28 LAB
AORTIC VALVE PEAK VELOCITY: 1.38 M/S
AV PEAK GRADIENT: 8 MMHG
AVA (PEAK VEL): 1.63 CM2
EJECTION FRACTION APICAL 4 CHAMBER: 63.8
EJECTION FRACTION: 62 %
LEFT ATRIUM VOLUME AREA LENGTH INDEX BSA: 18.9 ML/M2
LEFT VENTRICLE INTERNAL DIMENSION DIASTOLE: 4.3 CM (ref 3.5–6)
LEFT VENTRICULAR OUTFLOW TRACT DIAMETER: 1.7 CM
MITRAL VALVE E/A RATIO: 2.09
RIGHT VENTRICLE FREE WALL PEAK S': 13.8 CM/S
TRICUSPID ANNULAR PLANE SYSTOLIC EXCURSION: 2 CM

## 2025-01-28 PROCEDURE — 93306 TTE W/DOPPLER COMPLETE: CPT | Performed by: INTERNAL MEDICINE

## 2025-01-28 PROCEDURE — 93306 TTE W/DOPPLER COMPLETE: CPT

## 2025-02-12 ENCOUNTER — APPOINTMENT (OUTPATIENT)
Dept: PRIMARY CARE | Facility: CLINIC | Age: 35
End: 2025-02-12
Payer: COMMERCIAL

## 2025-03-10 ENCOUNTER — APPOINTMENT (OUTPATIENT)
Dept: PRIMARY CARE | Facility: CLINIC | Age: 35
End: 2025-03-10
Payer: COMMERCIAL

## 2025-03-10 VITALS
SYSTOLIC BLOOD PRESSURE: 115 MMHG | BODY MASS INDEX: 25.86 KG/M2 | HEART RATE: 94 BPM | OXYGEN SATURATION: 96 % | WEIGHT: 146 LBS | DIASTOLIC BLOOD PRESSURE: 72 MMHG

## 2025-03-10 DIAGNOSIS — R00.2 PALPITATIONS: Primary | ICD-10-CM

## 2025-03-10 PROCEDURE — 99213 OFFICE O/P EST LOW 20 MIN: CPT | Performed by: INTERNAL MEDICINE

## 2025-03-10 PROCEDURE — 1036F TOBACCO NON-USER: CPT | Performed by: INTERNAL MEDICINE

## 2025-03-10 NOTE — PROGRESS NOTES
Subjective   Patient ID: Tamia Eisenberg is a 34 y.o. female who presents for Follow-up.    HPI     Patient is a 34-year-old female with past medical history of hypothyroidism and anxiety who presents with chief complaint of  ongoing episodes of palpitations that occur sporadically throughout the day.  She checks her heart rate at home and it can be as high as 170 for a minute at a time.  Had an echocardiogram showing normal ejection fraction.    Concerning features        Review of Systems  Constitutional: No fever or chills  Cardiovascular: no chest pain, no palpitations and no syncope.   Respiratory: no cough, no shortness of breath during exertion and no shortness of breath at rest.   Gastrointestinal: no abdominal pain, no nausea and no vomiting.  Neuro: No Headache, no dizziness    Objective   /72   Pulse 94   Wt 66.2 kg (146 lb)   SpO2 96%   BMI 25.86 kg/m²     Physical Exam  Constitutional: Alert and in no acute distress. Well developed, well nourished  Head and Face: Head and face: Normal.    Cardiovascular: Heart rate and rhythm were normal, normal S1 and S2. No peripheral edema.   Pulmonary: No respiratory distress. Clear bilateral breath sounds.  Musculoskeletal: Gait and station: Normal. Muscle strength/tone: Normal.   Skin: Normal skin color and pigmentation, normal skin turgor, and no rash.    Psychiatric: Judgment and insight: Intact. Mood and affect: Normal.    Procedures    Lab Results   Component Value Date    WBC 7.4 01/14/2025    HGB 13.4 01/14/2025    HCT 41.8 01/14/2025     01/14/2025    CHOL 222 (H) 01/14/2025    TRIG 166 (H) 01/14/2025    HDL 47.4 01/14/2025    ALT 7 10/18/2023    AST 16 10/18/2023     01/14/2025    K 4.1 01/14/2025     01/14/2025    CREATININE 0.58 01/14/2025    BUN 10 01/14/2025    CO2 27 01/14/2025    TSH 3.87 01/14/2025    INR 1.0 10/21/2023    HGBA1C 5.2 12/07/2020       Transthoracic Echo (TTE) Complete     [facility], [id number]  [place] [address]                 Tel [phone] and Fax [phone]    TRANSTHORACIC ECHOCARDIOGRAM REPORT       Patient Name:       KAREN GUEVARA        Reading Physician:    [id number] Renato Woody MD  Study Date:         1/28/2025           Ordering Provider:    [id number] ISAIAH LAWTON  MRN/PID:            [id number]            Fellow:  Accession#:         [id number]        Nurse:  Date of Birth/Age:  1990 / 34      Sonographer:          Pita shore                                     RDMERRY  Gender assigned at  F                   Additional Staff:  Birth:  Height:             160.02 cm           Admit Date:  Weight:             68.49 kg            Admission Status:     Outpatient  BSA / BMI:          1.72 m2 / 26.75     Encounter#:           [id number]                      kg/m2  Blood Pressure:     106/81 mmHg         Department Location:  [facility]                                                                Non Invasive    Study Type:    TRANSTHORACIC ECHO (TTE) COMPLETE  Diagnosis/ICD: Palpitations-R00.2; Other chest pain-R07.89  Indication:    Palpitations; light headed  CPT Code:      Echo Complete w Full Doppler-20576    Patient History:  Pertinent History: Palpitations and lightheadedness.    Study Detail: The following Echo studies were performed: M-Mode, 2D, color flow                and Doppler.       PHYSICIAN INTERPRETATION:  Left Ventricle: Left ventricular ejection fraction is normal, calculated by Antonio's biplane at 62%. There are no regional left ventricular wall motion abnormalities. The left ventricular cavity size is normal. There is normal septal and normal posterior left ventricular wall thickness. Spectral Doppler shows a normal pattern of left ventricular diastolic filling.  Left Atrium: The left atrial size is  normal.  Right Ventricle: The right ventricle is normal in size. There is normal right ventricular global systolic function.  Right Atrium: The right atrium is normal in size.  Aortic Valve: The aortic valve is structurally normal. There is no evidence of aortic valve regurgitation. The peak instantaneous gradient of the aortic valve is 8 mmHg.  Mitral Valve: The mitral valve is normal in structure. There is no evidence of mitral valve regurgitation.  Tricuspid Valve: The tricuspid valve is structurally normal. There is trace tricuspid regurgitation. The right ventricular systolic pressure is unable to be estimated.  Pulmonic Valve: The pulmonic valve is structurally normal. There is trace pulmonic valve regurgitation.  Pericardium: There is no pericardial effusion noted.  Aorta: The aortic root is normal.  Systemic Veins: The inferior vena cava appears normal in size, with IVC inspiratory collapse greater than 50%.  In comparison to the previous echocardiogram(s): There are no prior studies on this patient for comparison purposes.       CONCLUSIONS:   1. Left ventricular ejection fraction is normal, calculated by Antonio's biplane at 62%.   2. There is normal right ventricular global systolic function.    QUANTITATIVE DATA SUMMARY:     2D MEASUREMENTS:          Normal Ranges:  Ao Root d:       2.50 cm  (2.0-3.7cm)  LAs:             2.90 cm  (2.7-4.0cm)  IVSd:            0.90 cm  (0.6-1.1cm)  LVPWd:           0.80 cm  (0.6-1.1cm)  LVIDd:           4.30 cm  (3.9-5.9cm)  LVIDs:           2.60 cm  LV Mass Index:   67 g/m2  LVEDV Index:     26 ml/m2  LV % FS          39.5 %       LEFT ATRIUM:                  Normal Ranges:  LA Vol A4C:        31.3 ml    (22+/-6mL/m2)  LA Vol A2C:        32.4 ml  LA Vol BP:         32.4 ml  LA Vol Index A4C:  18.3ml/m2  LA Vol Index A2C:  18.9 ml/m2  LA Vol Index BP:   18.9 ml/m2  LA Area A4C:       13.4 cm2  LA Area A2C:       13.4 cm2  LA Major Axis A4C: 4.9 cm  LA Major Axis A2C: 4.7  cm  LA Volume Index:   18.9 ml/m2       RIGHT ATRIUM:          Normal Ranges:  RA Area A4C:  11.6 cm2       AORTA MEASUREMENTS:         Normal Ranges:  Asc Ao, d:          2.30 cm (2.1-3.4cm)       LV SYSTOLIC FUNCTION:                       Normal Ranges:  EF-A4C View:    64 % (>=55%)  EF-A2C View:    58 %  EF-Biplane:     62 %  LV EF Reported: 62 %       LV DIASTOLIC FUNCTION:             Normal Ranges:  MV Peak E:             1.03 m/s    (0.7-1.2 m/s)  MV Peak A:             0.49 m/s    (0.42-0.7 m/s)  E/A Ratio:             2.09        (1.0-2.2)  MV e'                  0.150 m/s   (>8.0)  MV lateral e'          0.15 m/s  MV medial e'           0.15 m/s  MV A Dur:              92.00 msec  E/e' Ratio:            6.87        (<8.0)  MV DT:                 148 msec    (150-240 msec)  PulmV Sys Roque:         53.10 cm/s  PulmV Maynard Roque:        63.40 cm/s  PulmV S/D Roque:         0.80  PulmV A Revs Roque:      24.40 cm/s  PulmV A Revs Dur:      116.00 msec       MITRAL VALVE:          Normal Ranges:  MV DT:        148 msec (150-240msec)       AORTIC VALVE:           Normal Ranges:  AoV Vmax:      1.38 m/s (<=1.7m/s)  AoV Peak P.6 mmHg (<20mmHg)  LVOT Max Roque:  0.99 m/s (<=1.1m/s)  LVOT VTI:      18.80 cm  LVOT Diameter: 1.70 cm  (1.8-2.4cm)  AoV Area,Vmax: 1.63 cm2 (2.5-4.5cm2)       RIGHT VENTRICLE:  RV Basal 3.20 cm  RV Mid   2.30 cm  RV Major 6.2 cm  TAPSE:   19.9 mm  RV s'    0.14 m/s       TRICUSPID VALVE/RVSP:         Normal Ranges:  IVC Diam:             1.70 cm       PULMONIC VALVE:          Normal Ranges:  PV Accel Time:  153 msec (>120ms)  PV Max Roque:     0.9 m/s  (0.6-0.9m/s)  PV Max PG:      3.3 mmHg       PULMONARY VEINS:  PulmV A Revs Dur: 116.00 msec  PulmV A Revs Roque: 24.40 cm/s  PulmV Maynard Roque:   63.40 cm/s  PulmV S/D Roque:    0.80  PulmV Sys Roque:    53.10 cm/s       [id number] Renato Woody MD  Electronically signed on 2025 at 12:34:03 PM       ** Final **            Assessment/Plan   Problem List  Items Addressed This Visit    None  Visit Diagnoses         Codes    Palpitations    -  Primary R00.2    Relevant Orders    Holter or Event Cardiac Monitor          Considering continued episodes of palpitations and lightheadedness that occur sporadically and infrequently will order an Zio patch for further evaluation.    Can consider beta-blocker after completion of the patch

## 2025-05-07 ENCOUNTER — HOSPITAL ENCOUNTER (OUTPATIENT)
Dept: CARDIOLOGY | Facility: HOSPITAL | Age: 35
Discharge: HOME | End: 2025-05-07
Payer: COMMERCIAL

## 2025-05-07 DIAGNOSIS — R00.2 PALPITATIONS: ICD-10-CM

## 2025-05-07 PROCEDURE — 93247 EXT ECG>7D<15D SCAN A/R: CPT

## 2025-06-21 DIAGNOSIS — E06.3 HASHIMOTO'S THYROIDITIS: ICD-10-CM

## 2025-06-23 DIAGNOSIS — E06.3 HASHIMOTO'S THYROIDITIS: ICD-10-CM

## 2025-06-23 RX ORDER — LEVOTHYROXINE SODIUM 50 UG/1
TABLET ORAL
Qty: 90 TABLET | Refills: 3 | Status: SHIPPED | OUTPATIENT
Start: 2025-06-23

## 2025-08-05 RX ORDER — LEVOTHYROXINE SODIUM 50 UG/1
TABLET ORAL
Qty: 90 TABLET | Refills: 0 | OUTPATIENT
Start: 2025-08-05

## 2025-09-11 ENCOUNTER — APPOINTMENT (OUTPATIENT)
Dept: PRIMARY CARE | Facility: CLINIC | Age: 35
End: 2025-09-11
Payer: COMMERCIAL

## (undated) DEVICE — DRESSING, NON-ADHERENT, TELFA, OUCHLESS, 3 X 8 IN, STERILE

## (undated) DEVICE — SUTURE, VICRYL, 0, 36 IN, CT, UNDYED

## (undated) DEVICE — SUTURE, VICRYL 0, 36 IN, CT-1, VIOLET

## (undated) DEVICE — SUTURE, MONOCRYL, 4-0, 27 IN, PS-2, UNDYED

## (undated) DEVICE — DRESSING, ADHESIVE, ISLAND, TELFA, 4 X 10 IN

## (undated) DEVICE — DRESSING, ABDOMINAL, WET PRUF, TENDERSORB, 5 X 9 IN, STERILE